# Patient Record
Sex: FEMALE | Race: WHITE | Employment: OTHER | URBAN - METROPOLITAN AREA
[De-identification: names, ages, dates, MRNs, and addresses within clinical notes are randomized per-mention and may not be internally consistent; named-entity substitution may affect disease eponyms.]

---

## 2018-10-22 ENCOUNTER — HOSPITAL ENCOUNTER (OUTPATIENT)
Dept: GENERAL RADIOLOGY | Age: 83
Discharge: HOME | End: 2018-10-22
Payer: MEDICARE

## 2018-10-22 ENCOUNTER — HOSPITAL ENCOUNTER (OUTPATIENT)
Dept: PREADMISSION TESTING | Age: 83
Discharge: HOME | End: 2018-10-22
Payer: MEDICARE

## 2018-10-22 DIAGNOSIS — M17.11 OSTEOARTHRITIS OF RIGHT KNEE: ICD-10-CM

## 2018-10-22 LAB
ALBUMIN SERPL-MCNC: 3.3 G/DL (ref 3.4–5)
ALBUMIN/GLOB SERPL: 1 {RATIO} (ref 0.8–1.7)
ALP SERPL-CCNC: 61 U/L (ref 45–117)
ALT SERPL-CCNC: 22 U/L (ref 13–56)
ANION GAP SERPL CALC-SCNC: 7 MMOL/L (ref 3–18)
APPEARANCE UR: ABNORMAL
APTT PPP: 24.9 SEC (ref 23–36.4)
AST SERPL-CCNC: 21 U/L (ref 15–37)
BACTERIA SPEC CULT: NORMAL
BACTERIA URNS QL MICRO: ABNORMAL /HPF
BASOPHILS # BLD: 0 K/UL (ref 0–0.1)
BASOPHILS NFR BLD: 1 % (ref 0–2)
BILIRUB SERPL-MCNC: 0.3 MG/DL (ref 0.2–1)
BILIRUB UR QL: NEGATIVE
BUN SERPL-MCNC: 18 MG/DL (ref 7–18)
BUN/CREAT SERPL: 25 (ref 12–20)
CALCIUM SERPL-MCNC: 8.3 MG/DL (ref 8.5–10.1)
CHLORIDE SERPL-SCNC: 104 MMOL/L (ref 100–108)
CO2 SERPL-SCNC: 29 MMOL/L (ref 21–32)
COLOR UR: YELLOW
CREAT SERPL-MCNC: 0.71 MG/DL (ref 0.6–1.3)
DIFFERENTIAL METHOD BLD: ABNORMAL
EOSINOPHIL # BLD: 0.1 K/UL (ref 0–0.4)
EOSINOPHIL NFR BLD: 2 % (ref 0–5)
EPITH CASTS URNS QL MICRO: ABNORMAL /LPF (ref 0–5)
ERYTHROCYTE [DISTWIDTH] IN BLOOD BY AUTOMATED COUNT: 25.2 % (ref 11.6–14.5)
GLOBULIN SER CALC-MCNC: 3.2 G/DL (ref 2–4)
GLUCOSE SERPL-MCNC: 99 MG/DL (ref 74–99)
GLUCOSE UR STRIP.AUTO-MCNC: NEGATIVE MG/DL
HCT VFR BLD AUTO: 35.7 % (ref 35–45)
HGB BLD-MCNC: 11.1 G/DL (ref 12–16)
HGB UR QL STRIP: ABNORMAL
INR PPP: 0.9 (ref 0.8–1.2)
KETONES UR QL STRIP.AUTO: ABNORMAL MG/DL
LEUKOCYTE ESTERASE UR QL STRIP.AUTO: ABNORMAL
LYMPHOCYTES # BLD: 1.1 K/UL (ref 0.9–3.6)
LYMPHOCYTES NFR BLD: 17 % (ref 21–52)
MCH RBC QN AUTO: 26.2 PG (ref 24–34)
MCHC RBC AUTO-ENTMCNC: 31.1 G/DL (ref 31–37)
MCV RBC AUTO: 84.4 FL (ref 74–97)
MONOCYTES # BLD: 0.7 K/UL (ref 0.05–1.2)
MONOCYTES NFR BLD: 12 % (ref 3–10)
NEUTS SEG # BLD: 4.1 K/UL (ref 1.8–8)
NEUTS SEG NFR BLD: 68 % (ref 40–73)
NITRITE UR QL STRIP.AUTO: NEGATIVE
PH UR STRIP: 5 [PH] (ref 5–8)
PLATELET # BLD AUTO: 251 K/UL (ref 135–420)
PMV BLD AUTO: 10.4 FL (ref 9.2–11.8)
POTASSIUM SERPL-SCNC: 3.9 MMOL/L (ref 3.5–5.5)
PROT SERPL-MCNC: 6.5 G/DL (ref 6.4–8.2)
PROT UR STRIP-MCNC: 30 MG/DL
PROTHROMBIN TIME: 12.1 SEC (ref 11.5–15.2)
RBC # BLD AUTO: 4.23 M/UL (ref 4.2–5.3)
RBC #/AREA URNS HPF: ABNORMAL /HPF (ref 0–5)
SERVICE CMNT-IMP: NORMAL
SODIUM SERPL-SCNC: 140 MMOL/L (ref 136–145)
SP GR UR REFRACTOMETRY: >1.03 (ref 1–1.03)
UROBILINOGEN UR QL STRIP.AUTO: 1 EU/DL (ref 0.2–1)
WBC # BLD AUTO: 6.1 K/UL (ref 4.6–13.2)
WBC URNS QL MICRO: ABNORMAL /HPF (ref 0–5)

## 2018-10-22 PROCEDURE — 85025 COMPLETE CBC W/AUTO DIFF WBC: CPT | Performed by: ORTHOPAEDIC SURGERY

## 2018-10-22 PROCEDURE — 80053 COMPREHEN METABOLIC PANEL: CPT | Performed by: ORTHOPAEDIC SURGERY

## 2018-10-22 PROCEDURE — 85730 THROMBOPLASTIN TIME PARTIAL: CPT | Performed by: ORTHOPAEDIC SURGERY

## 2018-10-22 PROCEDURE — 81001 URINALYSIS AUTO W/SCOPE: CPT | Performed by: ORTHOPAEDIC SURGERY

## 2018-10-22 PROCEDURE — 93005 ELECTROCARDIOGRAM TRACING: CPT

## 2018-10-22 PROCEDURE — 71045 X-RAY EXAM CHEST 1 VIEW: CPT

## 2018-10-22 PROCEDURE — 85610 PROTHROMBIN TIME: CPT | Performed by: ORTHOPAEDIC SURGERY

## 2018-10-22 PROCEDURE — 87641 MR-STAPH DNA AMP PROBE: CPT | Performed by: ORTHOPAEDIC SURGERY

## 2018-10-22 PROCEDURE — 36415 COLL VENOUS BLD VENIPUNCTURE: CPT | Performed by: ORTHOPAEDIC SURGERY

## 2018-10-25 ENCOUNTER — HOSPITAL ENCOUNTER (OUTPATIENT)
Dept: PREADMISSION TESTING | Age: 83
Discharge: HOME | End: 2018-10-25
Payer: MEDICARE

## 2018-10-25 LAB
ABO + RH BLD: NORMAL
ATRIAL RATE: 73 BPM
BLOOD GROUP ANTIBODIES SERPL: NORMAL
CALCULATED P AXIS, ECG09: 59 DEGREES
CALCULATED R AXIS, ECG10: -72 DEGREES
CALCULATED T AXIS, ECG11: 46 DEGREES
DIAGNOSIS, 93000: NORMAL
P-R INTERVAL, ECG05: 174 MS
Q-T INTERVAL, ECG07: 372 MS
QRS DURATION, ECG06: 80 MS
QTC CALCULATION (BEZET), ECG08: 409 MS
SPECIMEN EXP DATE BLD: NORMAL
VENTRICULAR RATE, ECG03: 73 BPM

## 2018-10-25 PROCEDURE — 86900 BLOOD TYPING SEROLOGIC ABO: CPT | Performed by: ORTHOPAEDIC SURGERY

## 2018-10-25 PROCEDURE — 87086 URINE CULTURE/COLONY COUNT: CPT | Performed by: ORTHOPAEDIC SURGERY

## 2018-10-27 LAB
BACTERIA SPEC CULT: NORMAL
SERVICE CMNT-IMP: NORMAL

## 2018-11-06 RX ORDER — SODIUM CHLORIDE 0.9 % (FLUSH) 0.9 %
5-10 SYRINGE (ML) INJECTION EVERY 8 HOURS
Status: CANCELLED | OUTPATIENT
Start: 2018-11-07

## 2018-11-06 RX ORDER — SODIUM CHLORIDE 0.9 % (FLUSH) 0.9 %
5-10 SYRINGE (ML) INJECTION AS NEEDED
Status: CANCELLED | OUTPATIENT
Start: 2018-11-06

## 2018-11-07 ENCOUNTER — APPOINTMENT (OUTPATIENT)
Dept: GENERAL RADIOLOGY | Age: 83
DRG: 470 | End: 2018-11-07
Attending: PHYSICIAN ASSISTANT
Payer: MEDICARE

## 2018-11-07 ENCOUNTER — ANESTHESIA (OUTPATIENT)
Dept: SURGERY | Age: 83
DRG: 470 | End: 2018-11-07
Payer: MEDICARE

## 2018-11-07 ENCOUNTER — HOSPITAL ENCOUNTER (INPATIENT)
Age: 83
LOS: 3 days | Discharge: NURSING, REHAB OR LONG-TERM CARE | DRG: 470 | End: 2018-11-10
Attending: ORTHOPAEDIC SURGERY | Admitting: ORTHOPAEDIC SURGERY
Payer: MEDICARE

## 2018-11-07 ENCOUNTER — ANESTHESIA EVENT (OUTPATIENT)
Dept: SURGERY | Age: 83
DRG: 470 | End: 2018-11-07
Payer: MEDICARE

## 2018-11-07 DIAGNOSIS — Z96.651 TOTAL KNEE REPLACEMENT STATUS, RIGHT: Primary | ICD-10-CM

## 2018-11-07 PROCEDURE — 74011250636 HC RX REV CODE- 250/636: Performed by: PHYSICIAN ASSISTANT

## 2018-11-07 PROCEDURE — C1776 JOINT DEVICE (IMPLANTABLE): HCPCS | Performed by: ORTHOPAEDIC SURGERY

## 2018-11-07 PROCEDURE — 77030033067 HC SUT PDO STRATFX SPIR J&J -B: Performed by: ORTHOPAEDIC SURGERY

## 2018-11-07 PROCEDURE — 76210000006 HC OR PH I REC 0.5 TO 1 HR: Performed by: ORTHOPAEDIC SURGERY

## 2018-11-07 PROCEDURE — 74011000250 HC RX REV CODE- 250

## 2018-11-07 PROCEDURE — 77030032490 HC SLV COMPR SCD KNE COVD -B: Performed by: ORTHOPAEDIC SURGERY

## 2018-11-07 PROCEDURE — 74011250636 HC RX REV CODE- 250/636

## 2018-11-07 PROCEDURE — 65270000029 HC RM PRIVATE

## 2018-11-07 PROCEDURE — C1713 ANCHOR/SCREW BN/BN,TIS/BN: HCPCS | Performed by: ORTHOPAEDIC SURGERY

## 2018-11-07 PROCEDURE — 74011000250 HC RX REV CODE- 250: Performed by: ORTHOPAEDIC SURGERY

## 2018-11-07 PROCEDURE — C9290 INJ, BUPIVACAINE LIPOSOME: HCPCS | Performed by: ORTHOPAEDIC SURGERY

## 2018-11-07 PROCEDURE — 76010000131 HC OR TIME 2 TO 2.5 HR: Performed by: ORTHOPAEDIC SURGERY

## 2018-11-07 PROCEDURE — 73560 X-RAY EXAM OF KNEE 1 OR 2: CPT

## 2018-11-07 PROCEDURE — 74011250636 HC RX REV CODE- 250/636: Performed by: SPECIALIST

## 2018-11-07 PROCEDURE — 74011250636 HC RX REV CODE- 250/636: Performed by: ORTHOPAEDIC SURGERY

## 2018-11-07 PROCEDURE — 77030018842 HC SOL IRR SOD CL 9% BAXT -A: Performed by: ORTHOPAEDIC SURGERY

## 2018-11-07 PROCEDURE — 3E0T3BZ INTRODUCTION OF ANESTHETIC AGENT INTO PERIPHERAL NERVES AND PLEXI, PERCUTANEOUS APPROACH: ICD-10-PCS | Performed by: SPECIALIST

## 2018-11-07 PROCEDURE — 77030002933 HC SUT MCRYL J&J -A: Performed by: ORTHOPAEDIC SURGERY

## 2018-11-07 PROCEDURE — 77030011256 HC DRSG MEPILEX <16IN NO BORD MOLN -A: Performed by: ORTHOPAEDIC SURGERY

## 2018-11-07 PROCEDURE — 74011000258 HC RX REV CODE- 258: Performed by: ORTHOPAEDIC SURGERY

## 2018-11-07 PROCEDURE — 74011250637 HC RX REV CODE- 250/637: Performed by: PHYSICIAN ASSISTANT

## 2018-11-07 PROCEDURE — 76060000035 HC ANESTHESIA 2 TO 2.5 HR: Performed by: ORTHOPAEDIC SURGERY

## 2018-11-07 PROCEDURE — 77030031139 HC SUT VCRL2 J&J -A: Performed by: ORTHOPAEDIC SURGERY

## 2018-11-07 PROCEDURE — 77030020782 HC GWN BAIR PAWS FLX 3M -B: Performed by: ORTHOPAEDIC SURGERY

## 2018-11-07 PROCEDURE — 64447 NJX AA&/STRD FEMORAL NRV IMG: CPT | Performed by: ORTHOPAEDIC SURGERY

## 2018-11-07 PROCEDURE — 77030011628: Performed by: ORTHOPAEDIC SURGERY

## 2018-11-07 PROCEDURE — 0SRC0J9 REPLACEMENT OF RIGHT KNEE JOINT WITH SYNTHETIC SUBSTITUTE, CEMENTED, OPEN APPROACH: ICD-10-PCS | Performed by: ORTHOPAEDIC SURGERY

## 2018-11-07 PROCEDURE — 77030006804 HC BLD SAW RECIP CNMD -B: Performed by: ORTHOPAEDIC SURGERY

## 2018-11-07 PROCEDURE — 77030013708 HC HNDPC SUC IRR PULS STRY –B: Performed by: ORTHOPAEDIC SURGERY

## 2018-11-07 PROCEDURE — 77030038020 HC MANFLD NEPTUNE STRY -B: Performed by: ORTHOPAEDIC SURGERY

## 2018-11-07 PROCEDURE — 77030027138 HC INCENT SPIROMETER -A: Performed by: ORTHOPAEDIC SURGERY

## 2018-11-07 PROCEDURE — 77030014144 HC TY SPN ANES BBMI -B: Performed by: NURSE ANESTHETIST, CERTIFIED REGISTERED

## 2018-11-07 PROCEDURE — 74011000258 HC RX REV CODE- 258

## 2018-11-07 PROCEDURE — 77030013481 HC CUF TRNQT ZIMM -A: Performed by: ORTHOPAEDIC SURGERY

## 2018-11-07 PROCEDURE — 77030037875 HC DRSG MEPILEX <16IN BORD MOLN -A: Performed by: ORTHOPAEDIC SURGERY

## 2018-11-07 PROCEDURE — 77030038010: Performed by: ORTHOPAEDIC SURGERY

## 2018-11-07 PROCEDURE — 76942 ECHO GUIDE FOR BIOPSY: CPT | Performed by: ORTHOPAEDIC SURGERY

## 2018-11-07 DEVICE — CEMENT BNE 40GM FULL DOSE PMMA W/O ANTIBIO HI VISC N RADPQ: Type: IMPLANTABLE DEVICE | Site: KNEE | Status: FUNCTIONAL

## 2018-11-07 DEVICE — STEM FEM L50MM DIA14MM KNEE CEM REV ATTUNE: Type: IMPLANTABLE DEVICE | Site: KNEE | Status: FUNCTIONAL

## 2018-11-07 DEVICE — CEMENT BNE 40GM FULL DOSE PMMA W/ GENT HI VISC RADPQ LNG: Type: IMPLANTABLE DEVICE | Site: KNEE | Status: FUNCTIONAL

## 2018-11-07 DEVICE — COMPONENT PAT DIA32MM KNEE POLY CEM MEDIALIZED ANAT ATTUNE: Type: IMPLANTABLE DEVICE | Site: KNEE | Status: FUNCTIONAL

## 2018-11-07 RX ORDER — FENTANYL CITRATE 50 UG/ML
INJECTION, SOLUTION INTRAMUSCULAR; INTRAVENOUS AS NEEDED
Status: DISCONTINUED | OUTPATIENT
Start: 2018-11-07 | End: 2018-11-07 | Stop reason: HOSPADM

## 2018-11-07 RX ORDER — MAGNESIUM SULFATE 100 %
4 CRYSTALS MISCELLANEOUS AS NEEDED
Status: DISCONTINUED | OUTPATIENT
Start: 2018-11-07 | End: 2018-11-07 | Stop reason: HOSPADM

## 2018-11-07 RX ORDER — GUAIFENESIN 100 MG/5ML
81 LIQUID (ML) ORAL 2 TIMES DAILY
Status: DISCONTINUED | OUTPATIENT
Start: 2018-11-07 | End: 2018-11-10 | Stop reason: HOSPADM

## 2018-11-07 RX ORDER — KETOROLAC TROMETHAMINE 30 MG/ML
15 INJECTION, SOLUTION INTRAMUSCULAR; INTRAVENOUS EVERY 6 HOURS
Status: COMPLETED | OUTPATIENT
Start: 2018-11-07 | End: 2018-11-08

## 2018-11-07 RX ORDER — OMEPRAZOLE 20 MG/1
40 CAPSULE, DELAYED RELEASE ORAL DAILY
Status: DISCONTINUED | OUTPATIENT
Start: 2018-11-08 | End: 2018-11-10 | Stop reason: HOSPADM

## 2018-11-07 RX ORDER — MIDAZOLAM HYDROCHLORIDE 1 MG/ML
INJECTION, SOLUTION INTRAMUSCULAR; INTRAVENOUS AS NEEDED
Status: DISCONTINUED | OUTPATIENT
Start: 2018-11-07 | End: 2018-11-07 | Stop reason: HOSPADM

## 2018-11-07 RX ORDER — CEFAZOLIN SODIUM/WATER 2 G/20 ML
2 SYRINGE (ML) INTRAVENOUS EVERY 8 HOURS
Status: COMPLETED | OUTPATIENT
Start: 2018-11-07 | End: 2018-11-08

## 2018-11-07 RX ORDER — NIFEDIPINE 30 MG/1
30 TABLET, EXTENDED RELEASE ORAL DAILY
Status: DISCONTINUED | OUTPATIENT
Start: 2018-11-08 | End: 2018-11-10 | Stop reason: HOSPADM

## 2018-11-07 RX ORDER — DOCUSATE SODIUM 100 MG/1
100 CAPSULE, LIQUID FILLED ORAL 3 TIMES DAILY
Status: DISCONTINUED | OUTPATIENT
Start: 2018-11-07 | End: 2018-11-10 | Stop reason: HOSPADM

## 2018-11-07 RX ORDER — FLUTICASONE FUROATE AND VILANTEROL 100; 25 UG/1; UG/1
1 POWDER RESPIRATORY (INHALATION) DAILY
Status: DISCONTINUED | OUTPATIENT
Start: 2018-11-08 | End: 2018-11-10 | Stop reason: HOSPADM

## 2018-11-07 RX ORDER — DIPHENHYDRAMINE HYDROCHLORIDE 50 MG/ML
12.5 INJECTION, SOLUTION INTRAMUSCULAR; INTRAVENOUS
Status: DISCONTINUED | OUTPATIENT
Start: 2018-11-07 | End: 2018-11-10 | Stop reason: HOSPADM

## 2018-11-07 RX ORDER — CEFAZOLIN SODIUM/WATER 2 G/20 ML
2 SYRINGE (ML) INTRAVENOUS ONCE
Status: DISCONTINUED | OUTPATIENT
Start: 2018-11-07 | End: 2018-11-07 | Stop reason: CLARIF

## 2018-11-07 RX ORDER — FENTANYL CITRATE 50 UG/ML
25 INJECTION, SOLUTION INTRAMUSCULAR; INTRAVENOUS AS NEEDED
Status: DISCONTINUED | OUTPATIENT
Start: 2018-11-07 | End: 2018-11-07 | Stop reason: HOSPADM

## 2018-11-07 RX ORDER — NALOXONE HYDROCHLORIDE 0.4 MG/ML
0.4 INJECTION, SOLUTION INTRAMUSCULAR; INTRAVENOUS; SUBCUTANEOUS AS NEEDED
Status: DISCONTINUED | OUTPATIENT
Start: 2018-11-07 | End: 2018-11-10 | Stop reason: HOSPADM

## 2018-11-07 RX ORDER — DEXTROSE 50 % IN WATER (D50W) INTRAVENOUS SYRINGE
25-50 AS NEEDED
Status: DISCONTINUED | OUTPATIENT
Start: 2018-11-07 | End: 2018-11-07 | Stop reason: HOSPADM

## 2018-11-07 RX ORDER — SODIUM CHLORIDE 0.9 % (FLUSH) 0.9 %
5-10 SYRINGE (ML) INJECTION EVERY 8 HOURS
Status: DISCONTINUED | OUTPATIENT
Start: 2018-11-07 | End: 2018-11-10 | Stop reason: HOSPADM

## 2018-11-07 RX ORDER — ONDANSETRON 2 MG/ML
4 INJECTION INTRAMUSCULAR; INTRAVENOUS ONCE
Status: DISCONTINUED | OUTPATIENT
Start: 2018-11-07 | End: 2018-11-07 | Stop reason: HOSPADM

## 2018-11-07 RX ORDER — ONDANSETRON 2 MG/ML
INJECTION INTRAMUSCULAR; INTRAVENOUS AS NEEDED
Status: DISCONTINUED | OUTPATIENT
Start: 2018-11-07 | End: 2018-11-07 | Stop reason: HOSPADM

## 2018-11-07 RX ORDER — SODIUM CHLORIDE 9 MG/ML
125 INJECTION, SOLUTION INTRAVENOUS CONTINUOUS
Status: DISCONTINUED | OUTPATIENT
Start: 2018-11-07 | End: 2018-11-07 | Stop reason: HOSPADM

## 2018-11-07 RX ORDER — ACETAMINOPHEN 325 MG/1
975 TABLET ORAL EVERY 8 HOURS
Status: DISCONTINUED | OUTPATIENT
Start: 2018-11-07 | End: 2018-11-08

## 2018-11-07 RX ORDER — CLINDAMYCIN PHOSPHATE 900 MG/50ML
900 INJECTION, SOLUTION INTRAVENOUS ONCE
Status: COMPLETED | OUTPATIENT
Start: 2018-11-07 | End: 2018-11-07

## 2018-11-07 RX ORDER — DIPHENHYDRAMINE HCL 25 MG
25 CAPSULE ORAL
Status: DISCONTINUED | OUTPATIENT
Start: 2018-11-07 | End: 2018-11-10 | Stop reason: HOSPADM

## 2018-11-07 RX ORDER — LORAZEPAM 0.5 MG/1
0.5 TABLET ORAL
Status: DISCONTINUED | OUTPATIENT
Start: 2018-11-07 | End: 2018-11-10 | Stop reason: HOSPADM

## 2018-11-07 RX ORDER — SODIUM CHLORIDE, SODIUM LACTATE, POTASSIUM CHLORIDE, CALCIUM CHLORIDE 600; 310; 30; 20 MG/100ML; MG/100ML; MG/100ML; MG/100ML
125 INJECTION, SOLUTION INTRAVENOUS CONTINUOUS
Status: DISCONTINUED | OUTPATIENT
Start: 2018-11-07 | End: 2018-11-07

## 2018-11-07 RX ORDER — SODIUM CHLORIDE 0.9 % (FLUSH) 0.9 %
5-10 SYRINGE (ML) INJECTION AS NEEDED
Status: DISCONTINUED | OUTPATIENT
Start: 2018-11-07 | End: 2018-11-10 | Stop reason: HOSPADM

## 2018-11-07 RX ORDER — ONDANSETRON 2 MG/ML
4 INJECTION INTRAMUSCULAR; INTRAVENOUS
Status: DISCONTINUED | OUTPATIENT
Start: 2018-11-07 | End: 2018-11-10 | Stop reason: HOSPADM

## 2018-11-07 RX ORDER — LANOLIN ALCOHOL/MO/W.PET/CERES
325 CREAM (GRAM) TOPICAL
Status: DISCONTINUED | OUTPATIENT
Start: 2018-11-08 | End: 2018-11-10 | Stop reason: HOSPADM

## 2018-11-07 RX ORDER — FENTANYL CITRATE 50 UG/ML
50 INJECTION, SOLUTION INTRAMUSCULAR; INTRAVENOUS
Status: DISCONTINUED | OUTPATIENT
Start: 2018-11-07 | End: 2018-11-07 | Stop reason: HOSPADM

## 2018-11-07 RX ORDER — NALOXONE HYDROCHLORIDE 0.4 MG/ML
0.1 INJECTION, SOLUTION INTRAMUSCULAR; INTRAVENOUS; SUBCUTANEOUS AS NEEDED
Status: DISCONTINUED | OUTPATIENT
Start: 2018-11-07 | End: 2018-11-07 | Stop reason: HOSPADM

## 2018-11-07 RX ORDER — OXYCODONE HYDROCHLORIDE 5 MG/1
5 TABLET ORAL
Status: DISCONTINUED | OUTPATIENT
Start: 2018-11-07 | End: 2018-11-08

## 2018-11-07 RX ORDER — ESCITALOPRAM OXALATE 10 MG/1
10 TABLET ORAL
Status: DISCONTINUED | OUTPATIENT
Start: 2018-11-07 | End: 2018-11-10 | Stop reason: HOSPADM

## 2018-11-07 RX ORDER — LEFLUNOMIDE 10 MG/1
10 TABLET ORAL DAILY
Status: DISCONTINUED | OUTPATIENT
Start: 2018-11-08 | End: 2018-11-10 | Stop reason: HOSPADM

## 2018-11-07 RX ORDER — HYDROMORPHONE HYDROCHLORIDE 2 MG/ML
0.5 INJECTION, SOLUTION INTRAMUSCULAR; INTRAVENOUS; SUBCUTANEOUS
Status: DISCONTINUED | OUTPATIENT
Start: 2018-11-07 | End: 2018-11-10 | Stop reason: HOSPADM

## 2018-11-07 RX ORDER — OXYCODONE HYDROCHLORIDE 5 MG/1
10 TABLET ORAL
Status: DISCONTINUED | OUTPATIENT
Start: 2018-11-07 | End: 2018-11-08

## 2018-11-07 RX ORDER — SODIUM CHLORIDE 0.9 % (FLUSH) 0.9 %
5-10 SYRINGE (ML) INJECTION AS NEEDED
Status: DISCONTINUED | OUTPATIENT
Start: 2018-11-07 | End: 2018-11-07 | Stop reason: HOSPADM

## 2018-11-07 RX ORDER — ROPIVACAINE HYDROCHLORIDE 5 MG/ML
INJECTION, SOLUTION EPIDURAL; INFILTRATION; PERINEURAL
Status: COMPLETED | OUTPATIENT
Start: 2018-11-07 | End: 2018-11-07

## 2018-11-07 RX ORDER — SODIUM CHLORIDE, SODIUM LACTATE, POTASSIUM CHLORIDE, CALCIUM CHLORIDE 600; 310; 30; 20 MG/100ML; MG/100ML; MG/100ML; MG/100ML
100 INJECTION, SOLUTION INTRAVENOUS CONTINUOUS
Status: DISPENSED | OUTPATIENT
Start: 2018-11-07 | End: 2018-11-08

## 2018-11-07 RX ORDER — PROPOFOL 10 MG/ML
INJECTION, EMULSION INTRAVENOUS
Status: DISCONTINUED | OUTPATIENT
Start: 2018-11-07 | End: 2018-11-07 | Stop reason: HOSPADM

## 2018-11-07 RX ADMIN — ACETAMINOPHEN 975 MG: 325 TABLET ORAL at 20:50

## 2018-11-07 RX ADMIN — MIDAZOLAM HYDROCHLORIDE 1 MG: 1 INJECTION, SOLUTION INTRAMUSCULAR; INTRAVENOUS at 15:49

## 2018-11-07 RX ADMIN — FENTANYL CITRATE 25 MCG: 50 INJECTION, SOLUTION INTRAMUSCULAR; INTRAVENOUS at 15:59

## 2018-11-07 RX ADMIN — MIDAZOLAM HYDROCHLORIDE 1 MG: 1 INJECTION, SOLUTION INTRAMUSCULAR; INTRAVENOUS at 15:45

## 2018-11-07 RX ADMIN — ONDANSETRON 4 MG: 2 INJECTION INTRAMUSCULAR; INTRAVENOUS at 15:47

## 2018-11-07 RX ADMIN — Medication 10 ML: at 22:00

## 2018-11-07 RX ADMIN — SODIUM CHLORIDE, SODIUM LACTATE, POTASSIUM CHLORIDE, AND CALCIUM CHLORIDE 125 ML/HR: 600; 310; 30; 20 INJECTION, SOLUTION INTRAVENOUS at 11:53

## 2018-11-07 RX ADMIN — FENTANYL CITRATE 25 MCG: 50 INJECTION, SOLUTION INTRAMUSCULAR; INTRAVENOUS at 16:14

## 2018-11-07 RX ADMIN — DOCUSATE SODIUM 100 MG: 100 CAPSULE, LIQUID FILLED ORAL at 22:05

## 2018-11-07 RX ADMIN — FENTANYL CITRATE 25 MCG: 50 INJECTION, SOLUTION INTRAMUSCULAR; INTRAVENOUS at 15:56

## 2018-11-07 RX ADMIN — ESCITALOPRAM OXALATE 10 MG: 10 TABLET ORAL at 22:05

## 2018-11-07 RX ADMIN — Medication 81 MG: at 22:05

## 2018-11-07 RX ADMIN — MIDAZOLAM HYDROCHLORIDE 2 MG: 1 INJECTION, SOLUTION INTRAMUSCULAR; INTRAVENOUS at 13:38

## 2018-11-07 RX ADMIN — MIDAZOLAM HYDROCHLORIDE 2 MG: 1 INJECTION, SOLUTION INTRAMUSCULAR; INTRAVENOUS at 14:46

## 2018-11-07 RX ADMIN — HYDROMORPHONE HYDROCHLORIDE 0.5 MG: 2 INJECTION INTRAMUSCULAR; INTRAVENOUS; SUBCUTANEOUS at 23:10

## 2018-11-07 RX ADMIN — FENTANYL CITRATE 25 MCG: 50 INJECTION, SOLUTION INTRAMUSCULAR; INTRAVENOUS at 18:19

## 2018-11-07 RX ADMIN — TRANEXAMIC ACID 1 G: 100 INJECTION, SOLUTION INTRAVENOUS at 16:19

## 2018-11-07 RX ADMIN — SODIUM CHLORIDE, SODIUM LACTATE, POTASSIUM CHLORIDE, AND CALCIUM CHLORIDE: 600; 310; 30; 20 INJECTION, SOLUTION INTRAVENOUS at 15:17

## 2018-11-07 RX ADMIN — CLINDAMYCIN PHOSPHATE 900 MG: 900 INJECTION, SOLUTION INTRAVENOUS at 14:54

## 2018-11-07 RX ADMIN — FENTANYL CITRATE 25 MCG: 50 INJECTION, SOLUTION INTRAMUSCULAR; INTRAVENOUS at 16:11

## 2018-11-07 RX ADMIN — TRANEXAMIC ACID 1 G: 100 INJECTION, SOLUTION INTRAVENOUS at 15:00

## 2018-11-07 RX ADMIN — Medication 2 G: at 20:50

## 2018-11-07 RX ADMIN — ROPIVACAINE HYDROCHLORIDE 15 ML: 5 INJECTION, SOLUTION EPIDURAL; INFILTRATION; PERINEURAL at 13:40

## 2018-11-07 RX ADMIN — PROPOFOL 50 MCG/KG/MIN: 10 INJECTION, EMULSION INTRAVENOUS at 15:03

## 2018-11-07 RX ADMIN — SODIUM CHLORIDE, SODIUM LACTATE, POTASSIUM CHLORIDE, AND CALCIUM CHLORIDE 100 ML/HR: 600; 310; 30; 20 INJECTION, SOLUTION INTRAVENOUS at 22:07

## 2018-11-07 RX ADMIN — KETOROLAC TROMETHAMINE 15 MG: 30 INJECTION, SOLUTION INTRAMUSCULAR at 20:50

## 2018-11-07 NOTE — PROGRESS NOTES
PT orders received and chart reviewed. Arrived at pt room 208 however pt is not on floor yet. Will return later as pt arrives and schedule allows.

## 2018-11-07 NOTE — PERIOP NOTES
Pt. Used restroom in pre-op area with assistance. Patient placed on Damien Paws for a minimum of 30 min in  Preop.

## 2018-11-07 NOTE — ANESTHESIA PROCEDURE NOTES
Peripheral Block    Start time: 11/7/2018 1:38 PM  End time: 11/7/2018 1:41 PM  Performed by: Anabelle Galindo MD  Authorized by: Anabelle Galindo MD       Pre-procedure: Indications: at surgeon's request, post-op pain management and procedure for pain    Preanesthetic Checklist: patient identified, risks and benefits discussed, site marked, timeout performed, anesthesia consent given and patient being monitored    Timeout Time: 13:38          Block Type:   Block Type:   Adductor canal  Laterality:  Right  Monitoring:  Standard ASA monitoring, responsive to questions, continuous pulse ox, oxygen, frequent vital sign checks and heart rate  Injection Technique:  Single shot  Procedures: ultrasound guided    Patient Position: supine  Prep: chlorhexidine    Location:  Mid thigh  Needle Type:  Stimuplex  Needle Gauge:  21 G  Needle Localization:  Anatomical landmarks    Assessment:  Number of attempts:  1  Injection Assessment:  Incremental injection every 5 mL, no paresthesia, ultrasound image on chart, local visualized surrounding nerve on ultrasound, negative aspiration for blood, no intravascular symptoms and low pressure verified by pressure monitor  Patient tolerance:  Patient tolerated the procedure well with no immediate complications

## 2018-11-07 NOTE — ANESTHESIA POSTPROCEDURE EVALUATION
Post-Anesthesia Evaluation and Assessment    Cardiovascular Function/Vital Signs  Visit Vitals  /85   Pulse 78   Temp 36.8 °C (98.2 °F)   Resp 24   Ht 4' 11\" (1.499 m)   Wt 54.5 kg (120 lb 2 oz)   SpO2 100%   BMI 24.26 kg/m²       Patient is status post Procedure(s):  RIGHT  KNEE COMPLEX PRIMARY TOTAL KNEE ARTHROPLASTY AND ALL INDICATED PROCEDURES. Nausea/Vomiting: Controlled. Postoperative hydration reviewed and adequate. Pain:  Pain Scale 1: Numeric (0 - 10) (11/07/18 1720)  Pain Intensity 1: 0 (11/07/18 1720)   Managed. Neurological Status:   Neuro (WDL): Exceptions to WDL (11/07/18 1720)   At baseline. Mental Status and Level of Consciousness: Arousable. Pulmonary Status:   O2 Device: Nasal cannula (11/07/18 1720)   Adequate oxygenation and airway patent. Complications related to anesthesia: None    Post-anesthesia assessment completed. No concerns. Patient has met all discharge requirements. Signed By: Carlitos Winchester CRNA    November 7, 2018             Procedure(s):  RIGHT  KNEE COMPLEX PRIMARY TOTAL KNEE ARTHROPLASTY AND ALL INDICATED PROCEDURES. Anesthesia Post Evaluation        Comments: Post-Anesthesia Evaluation and Assessment    Cardiovascular Function/Vital Signs  /85   Pulse 78   Temp 36.8 °C (98.2 °F)   Resp 24   Ht 4' 11\" (1.499 m)   Wt 54.5 kg (120 lb 2 oz)   SpO2 100%   BMI 24.26 kg/m²     Patient is status post Procedure(s):  RIGHT  KNEE COMPLEX PRIMARY TOTAL KNEE ARTHROPLASTY AND ALL INDICATED PROCEDURES. Nausea/Vomiting: Controlled. Postoperative hydration reviewed and adequate. Pain:  Pain Scale 1: Numeric (0 - 10) (11/07/18 1720)  Pain Intensity 1: 0 (11/07/18 1720)   Managed. Neurological Status:   Neuro (WDL): Exceptions to WDL (11/07/18 1720)   At baseline. Mental Status and Level of Consciousness: Arousable.     Pulmonary Status:   O2 Device: Nasal cannula (11/07/18 1720)   Adequate oxygenation and airway patent. Complications related to anesthesia: None    Post-anesthesia assessment completed. No concerns. Patient has met all discharge requirements.     Signed By: Irineo Mena MD    November 7, 2018                     Visit Vitals  /85   Pulse 78   Temp 36.8 °C (98.2 °F)   Resp 24   Ht 4' 11\" (1.499 m)   Wt 54.5 kg (120 lb 2 oz)   SpO2 100%   BMI 24.26 kg/m²

## 2018-11-07 NOTE — ROUTINE PROCESS
TRANSFER - IN REPORT:    Verbal report received from Kj Hogan RN on Aiyana Piña  being received from PACU for routine post - op. Report consisted of patients Situation, Background, Assessment and   Recommendations(SBAR). Information from the following report(s) SBAR, Kardex, OR Summary, Intake/Output and MAR was reviewed with the receiving nurse. Opportunity for questions and clarification was provided. Assessment to be completed upon patients arrival to unit and care assumed.

## 2018-11-07 NOTE — PERIOP NOTES
TRANSFER - IN REPORT:    Verbal report received from Maggie Slater CRNA and Marcie Smith RN (name) on Elsa Thompson  being received from OR (unit) for routine post - op      Report consisted of patients Situation, Background, Assessment and   Recommendations(SBAR). Information from the following report(s) SBAR, Kardex, OR Summary, Procedure Summary, Intake/Output and MAR was reviewed with the receiving nurse. Opportunity for questions and clarification was provided. Assessment completed upon patients arrival to unit and care assumed.

## 2018-11-07 NOTE — INTERVAL H&P NOTE
H&P Update:  Dandre Dee was seen and examined. History and physical has been reviewed. The patient has been examined.  There have been no significant clinical changes since the completion of the originally dated History and Physical.    Signed By: Elisabeth Fernandez MD     November 7, 2018 12:10 PM

## 2018-11-07 NOTE — OP NOTES
[address] [place], [place] [address], [phone]    COMPLEX PRIMARY TOTAL KNEE ARTHROPLASTY    Patient: Emperatriz Valdivia MRN: [id number]  SSN: [id number]    YOB: 1933  Age: 80 y.o. Sex: female      Date of Surgery: 11/7/2018   Preoperative Diagnosis: RIGHT KNEE OSTEOARTHRITIS   Postoperative Diagnosis: RIGHT KNEE OSTEOARTHRITIS   Location: [facility]  Surgeon: Jennifer Bishop MD  Physician Assistant: JASMINA Ortiz was medically necessary for holding of retractors for exposure and to complete the case  Assistant: Circ-1: Nathalie Latif RN  Circ-Relief: Apryl Dunham RN  Physician Assistant: Xiao Vega PA-C  Scrub Tech-2: Sherry Ashford  Scrub Tech-Relief: Adriana Edwards  Scrub RN-1: Marti Johnston RN  Surg Asst-Relief: Pipe Dan    Anesthesia: SPINAL + Low femoral Nerve Block + local    Procedure: Right Total Knee Arthroplasty (CPT: 60057), modifier 22: 20% increased time and effort due to severe valgus deformity requiring stemmed revision type implants    Findings:  Degenerative joint disease of the right knee with severe valgus deformity    Estimated Blood Loss: 200 mL    Fluids: see anesthesia record    Specimens: None    Cultures: None    Complications: None    Tourniquet Time:   Total Tourniquet Time Documented:  Thigh (Right) - 17 minutes  Total: Thigh (Right) - 17 minutes    Tourniquet Pressure: 250 mm Hg and 300 mm Hg    Tranexamic Acid: 1 gram IV: one dose at begining of case and one at the end    Exparel solution: 20 mL (13 mg/mL) + 40 mL NS    Implants:   Implant Name Type Inv.  Item Serial No.  Lot No. LRB No. Used Action   CEMENT BNE [facility] GHV 40GM -- SMARTSET - [id number]  CEMENT [facility] GHV 40GM -- SMARTSET  [facility] ORTHOPEDICS [id number] Right 1 Implanted   CEMENT BNE SMARTSET HV 40GM -- ORDER IN SETS OF 20 - [id number]  CEMENT BNE SMARTSET HV 40GM -- ORDER IN SETS OF 20  [facility] ORTHOPEDICS [id number] Right 1 Implanted   PAT TORO MEDIAL FAREED 32MM -- ATTUNE - [id number]  PAT TORO MEDIAL FAREED 32MM -- ATTUNE  [facility] [id number] Right 1 Implanted   ATTUNE KNEE SYSTEM REVISION CEMENTED STEM 14MM X 80MM    [facility] [id number] Right 1 Implanted   ATTUNE KNEE SYSTEM REVISION CRS FEMORAL SIZE 5 RIGHT CEMENETED     [facility] ORTHOPEDICS [id number] Right 1 Implanted   STEM TORO [id number] -- ATTUNE - [id number]  STEM TORO [id number] -- ATTUNE  [facility] [id number] Right 1 Implanted   ATTUNE KNEE SYSTEM REVISION TIBIAL BASE ROTATING PLATFORM SIZE 3 CEMENTED     [facility] [id number] Right 1 Implanted   ATTUNE KNEE SYSTEM REVISION CRS ROTATING PLATFORM INSERT SIZE 5 6MM AOX    [facility] [id number] Right 1 Implanted        Patient Condition: Stable.    ---------  INDICATIONS:   This 80y.o.-year-old female has had pain in the right knee for many years and has become functionally disabled with respect to the activities of daily living. The pain is increased with weight-bearing. The pain and dysfunction were not releaved by at least three months of conservative therapy such as NSAIDS (ibuprofen, aleve), analgesics (tylenol), structured flexibility and muscle strengthening physical therapy exercises, activity restrictions, intraarticular cortisone injections, bracing, and use of a cane. The radiographs showed valgus alignment and advanced arthritis with joint space narrowing, subchondral sclerosis, and periarticular osteophytes. A right total knee replacement has been recommended. The risks and the possible complications of this surgery and anesthesia including, but not exclusive to, bleeding, infection, dislocation, fracture, blood clots, nerve and vascular injury, possible need for other procedures, and possibly death have been explained to the patient.   The patient accepts these risks for the benefits of joint replacement over the failure of non-operative care to provide adequate pain relief and improve their functional disability. The patients medical problems have been addressed by their primary care physician and are deemed stable and as well controlled as possible. Inpatient hospital care was medically necessary, reasonable, and appropriate. This is a medically necessary procedure. As such, without use of a surgical assistant to retract soft tissues, protect vital neuro-vascular structures and assist in the technical aspects of the operation, this procedure would not have been possible. Therefore this assistant was medically necessary. This patient has no local or systemic contraindications to total joint arthroplasty. DESCRIPTION OF PROCEDURE:    After the risks and benefits of surgery were discussed, informed consent was obtained.  The patient was identified in the preoperative holding area and the operative extremity was marked.  Preoperative femoral nerve block was performed by anesthesia at the level of the adductor canal. The patient was taken to the operating room and placed in the supine position.  Spinal anesthesia was performed.  IV antibiotics were given.  A Holt catheter was not placed.  After verification of the appropriate operative site from the consent form, with confirmation of surgeon, anesthesia and nursing teams, a tourniquet was placed and the right leg was prepped and draped in sterile fashion using Chloraprep.  A formal timeout was performed.  The tourniquet was inflated and a midline incision was made over the anterior knee medial to the tibial tubercle and the dissection was taken down to Kyung's fascia.  A medial parapatellar arthrotomy was performed, medial release was made and the infrapatellar fat pad was trimmed.  The anterior portions of the medial and lateral menisci were excised.      There was bleeding in spite of the tourniquet at 250 mm Hg, so it was let down.  Esmark exsanguination was performed and it was reinflated to 300 mm Hg, but still there was bleeding. This may be due to her calcified arteries which could cause tourniquet to be ineffective. The case continued without tourniquet after a total tourniquet time of 17 minutes. The patella measured 22 mm.  A freehand patellar cut was made followed by placement of the protective plate.  The knee was flexed and the patella was  allowed to subluxate into the lateral gutter and the knee was flexed.  Inspection of the knee revealed cartilage loss from all three compartments greatest laterally. The femur was drilled and intramedullary cutting jig was placed in 5 degrees of valgus and 9 mm of distal resection.  The distal femoral cut was made.  The tibia was then subluxed anteriorly with a large bent knee retractor.  The PCL was released from the posterior tibia.  The remaining medial and lateral menisci and the periarticular osteophytes were removed.  The lateral genicular artery was cauterized.  An extramedullary tibial guide was used and a tibial cut was made.  The tibia was reamed up to 14 mm. The extension gap was assessed to ensure full extension could be achieved.  Trial size 3 tibial component was placed with 14 mm stem. Gap measuring blocks were then used to examine flexion and extension gaps and confirm femoral component size and rotation.   Positioning pins for the distal femoral cutting block were placed into the femur over the IM bronson.  The size 5 Attune femoral cutting block was placed.  Anterior-posterior position of the cutting guide was checked using a Puga guide.  Femoral cuts were made. Using the Finishing Guide over the IM bronson, the notch bone was removed. Posterior osteophytes were removed.     Femoral trial implant was placed.  The patella preparation was then completed with sizing and positioning of the patellar component and drilling of the patellar lugs.  The trial patellar implant measured 22 mm.  Patellar tracking was midline so no lateral retinacular release was needed.  Ligament balancing was performed as needed with release of MCL done for exposure done earlier in the case.  The PCL was sacrificed and there was significant lateral femoral bone loss so varus valgus constrained stemmed implants were used.  Excellent stability was achieved.   A complete synovectomy was performed.  The trial components were removed.  20 cc of diluted Exparel solution and 10 cc of 0.25% Marcaine were seperately injected into the posterior soft tissues using seperate syringes taking care to aspirate prior to injecting to prevent any intravascular injection.  The cement was prepared. After preparing the bony surfaces with pulsatile lavage saline, the real components were cemented into place.  Excess cement was removed prior to hardening with the cement allowed to set up with axial pressure across the knee in full extension. While the cement was drying, an additional 20 cc of 0.25% Marcaine and 40 cc of diluted Exparel solution were injected into the periarticular soft tissues and periosteum.  After drying of the cement, the knee was checked for any remaining excess cement and irrigated.  A standard layered closure was performed using #1 PDS Stratafix for the fascia, 0 Vicryl for the subcutaneous layer.  3-0 Vicryl was used for the subcuticular layer followed by a running subcuticular skin closure with a #3-0 Monocryl.  PRINEO was applied.  Sterile dressings were placed.  The patient was awakened and there were no complications.  Final sponge and needle counts were correct x2. This case required 20% increased time and effort due to severe valgus deformity requiring stemmed revision type implants.     Sarahy García MD

## 2018-11-07 NOTE — ANESTHESIA PREPROCEDURE EVALUATION
Anesthetic History   No history of anesthetic complications            Review of Systems / Medical History  Patient summary reviewed, nursing notes reviewed and pertinent labs reviewed    Pulmonary            Asthma : well controlled       Neuro/Psych   Within defined limits           Cardiovascular                  Exercise tolerance: >4 METS     GI/Hepatic/Renal  Within defined limits              Endo/Other        Arthritis     Other Findings              Physical Exam    Airway  Mallampati: II  TM Distance: 4 - 6 cm  Neck ROM: normal range of motion   Mouth opening: Normal     Cardiovascular  Regular rate and rhythm,  S1 and S2 normal,  no murmur, click, rub, or gallop             Dental  No notable dental hx       Pulmonary  Breath sounds clear to auscultation               Abdominal  GI exam deferred       Other Findings            Anesthetic Plan    ASA: 2  Anesthesia type: spinal and regional - femoral single shot  Risk and benefits fully explained to the patient including bleeding, headache, nerve damage, infection, nausea, back pain, and hemodynamic changes. Patient understands and agrees to the procedure.     Post-op pain plan if not by surgeon: peripheral nerve block single    Induction: Intravenous  Anesthetic plan and risks discussed with: Patient

## 2018-11-07 NOTE — BRIEF OP NOTE
BRIEF OPERATIVE NOTE    Date of Procedure: 11/7/2018   Preoperative Diagnosis: RIGHT KNEE OSTEOARTHRITIS  Postoperative Diagnosis: RIGHT KNEE OSTEOARTHRITIS    Procedure(s):  RIGHT  KNEE COMPLEX PRIMARY TOTAL KNEE ARTHROPLASTY AND ALL INDICATED PROCEDURES  Surgeon(s) and Role:     * Fransisca Bay MD - Primary         Surgical Assistant: JASMINA Capellan    Surgical Staff:  Circ-1: Lulla Schwab, RN  Circ-Relief: Sandeep Montoya RN  Physician Assistant: JASMINA Curtis-BETSY  Scrub Tech-2: Blue Harris  Scrub Tech-Relief: Mar Glynn  Scrub RN-1: Regis Awan RN  Surg Asst-Relief: Da Liming  Event Time In Time Out   Incision Start 1512    Incision Close       Anesthesia: Regional   Estimated Blood Loss: 200 mL  Specimens: * No specimens in log *   Findings: DJD, severe valgus deformity   Complications: none  Implants:   Implant Name Type Inv.  Item Serial No.  Lot No. LRB No. Used Action   CEMENT BNE [facility] GHV 40GM -- SMARTSET - [id number]  CEMENT BNE [facility] 40GM -- SMARTSET  [facility] [id number] Right 1 Implanted   CEMENT BNE SMARTSET HV 40GM -- ORDER IN SETS OF 20 - [id number]  CEMENT BNE SMARTSET HV 40GM -- ORDER IN SETS OF 20  [facility] [id number] Right 1 Implanted   PAT TORO MEDIAL FAREED 32MM -- ATTUNE - [id number]  PAT TORO MEDIAL FAREED 32MM -- ATTUNE  [facility] [employer] [id number] Right 1 Implanted   ATTUNE KNEE SYSTEM REVISION CEMENTED STEM 14MM X 80MM    [facility] [employer] [id number] Right 1 Implanted   ATTUNE KNEE SYSTEM REVISION CRS FEMORAL SIZE 5 RIGHT CEMENETED     [facility] [employer] [id number] Right 1 Implanted   STEM TORO [id number] -- ATTUNE - [id number]  STEM TORO [id number] -- ATTUNE  [facility] [employer] [id number] Right 1 Implanted   ATTUNE KNEE SYSTEM REVISION TIBIAL BASE ROTATING PLATFORM SIZE 3 CEMENTED     [facility] [employer] [id number] Right 1 Implanted   ATTUNE KNEE SYSTEM REVISION CRS ROTATING PLATFORM INSERT SIZE 5 6MM AOX [facility] ORTHOPEDICS  Right 1 Implanted

## 2018-11-07 NOTE — PERIOP NOTES
Bedside report to receiving nurse Debby GARCIA, current vital signs noted below. Dressing dry and intact. Family in waiting room. No further questions from receiving nurse. Skin assessment completed.     Visit Vitals  /72 (BP 1 Location: Right arm, BP Patient Position: At rest)   Pulse 79   Temp 98.2 °F (36.8 °C)   Resp 18   Ht 4' 11\" (1.499 m)   Wt 54.5 kg (120 lb 2 oz)   SpO2 98%   BMI 24.26 kg/m²     Willy Rosario RN

## 2018-11-08 ENCOUNTER — APPOINTMENT (OUTPATIENT)
Dept: GENERAL RADIOLOGY | Age: 83
DRG: 470 | End: 2018-11-08
Attending: HOSPITALIST
Payer: MEDICARE

## 2018-11-08 PROBLEM — R06.02 SOB (SHORTNESS OF BREATH): Status: ACTIVE | Noted: 2018-11-08

## 2018-11-08 PROBLEM — R00.0 TACHYCARDIA: Status: ACTIVE | Noted: 2018-11-08

## 2018-11-08 LAB
ANION GAP SERPL CALC-SCNC: 9 MMOL/L (ref 3–18)
BASOPHILS # BLD: 0 K/UL (ref 0–0.1)
BASOPHILS NFR BLD: 0 % (ref 0–2)
BUN SERPL-MCNC: 12 MG/DL (ref 7–18)
BUN/CREAT SERPL: 17
CALCIUM SERPL-MCNC: 7.6 MG/DL (ref 8.5–10.1)
CHLORIDE SERPL-SCNC: 101 MMOL/L (ref 100–108)
CK MB CFR SERPL CALC: 1.1 % (ref 0–4)
CK MB SERPL-MCNC: 1.9 NG/ML (ref 5–25)
CK SERPL-CCNC: 174 U/L (ref 26–192)
CO2 SERPL-SCNC: 25 MMOL/L (ref 21–32)
CREAT SERPL-MCNC: 0.7 MG/DL (ref 0.6–1.3)
DIFFERENTIAL METHOD BLD: ABNORMAL
EOSINOPHIL # BLD: 0.1 K/UL (ref 0–0.4)
EOSINOPHIL NFR BLD: 1 % (ref 0–5)
GLUCOSE SERPL-MCNC: 98 MG/DL (ref 74–99)
HCT VFR BLD AUTO: 31.1 % (ref 35–45)
HGB BLD-MCNC: 10 G/DL (ref 12–16)
LYMPHOCYTES # BLD: 0.7 K/UL (ref 0.9–3.6)
LYMPHOCYTES NFR BLD: 8 % (ref 21–52)
MCH RBC QN AUTO: 28.4 PG (ref 24–34)
MCHC RBC AUTO-ENTMCNC: 32.2 G/DL (ref 31–37)
MCV RBC AUTO: 88.4 FL (ref 74–97)
MONOCYTES # BLD: 1 K/UL (ref 0.05–1.2)
MONOCYTES NFR BLD: 11 % (ref 3–10)
NEUTS SEG # BLD: 7.5 K/UL (ref 1.8–8)
NEUTS SEG NFR BLD: 80 % (ref 40–73)
PLATELET # BLD AUTO: 208 K/UL (ref 135–420)
PMV BLD AUTO: 9.2 FL (ref 9.2–11.8)
POTASSIUM SERPL-SCNC: 4.5 MMOL/L (ref 3.5–5.5)
RBC # BLD AUTO: 3.52 M/UL (ref 4.2–5.3)
RBC MORPH BLD: ABNORMAL
SODIUM SERPL-SCNC: 135 MMOL/L (ref 136–145)
TROPONIN I SERPL-MCNC: <0.02 NG/ML (ref 0–0.04)
WBC # BLD AUTO: 9.3 K/UL (ref 4.6–13.2)

## 2018-11-08 PROCEDURE — 97530 THERAPEUTIC ACTIVITIES: CPT

## 2018-11-08 PROCEDURE — 65270000029 HC RM PRIVATE

## 2018-11-08 PROCEDURE — 82553 CREATINE MB FRACTION: CPT

## 2018-11-08 PROCEDURE — 97116 GAIT TRAINING THERAPY: CPT

## 2018-11-08 PROCEDURE — 74011250637 HC RX REV CODE- 250/637: Performed by: ORTHOPAEDIC SURGERY

## 2018-11-08 PROCEDURE — 80048 BASIC METABOLIC PNL TOTAL CA: CPT

## 2018-11-08 PROCEDURE — 74011250637 HC RX REV CODE- 250/637: Performed by: PHYSICIAN ASSISTANT

## 2018-11-08 PROCEDURE — 85025 COMPLETE CBC W/AUTO DIFF WBC: CPT

## 2018-11-08 PROCEDURE — 71045 X-RAY EXAM CHEST 1 VIEW: CPT

## 2018-11-08 PROCEDURE — 74011250636 HC RX REV CODE- 250/636: Performed by: PHYSICIAN ASSISTANT

## 2018-11-08 PROCEDURE — 93005 ELECTROCARDIOGRAM TRACING: CPT

## 2018-11-08 PROCEDURE — 36415 COLL VENOUS BLD VENIPUNCTURE: CPT

## 2018-11-08 PROCEDURE — 97161 PT EVAL LOW COMPLEX 20 MIN: CPT

## 2018-11-08 PROCEDURE — 77030012893

## 2018-11-08 RX ORDER — OXYCODONE AND ACETAMINOPHEN 5; 325 MG/1; MG/1
TABLET ORAL
Qty: 84 TAB | Refills: 0 | Status: SHIPPED | OUTPATIENT
Start: 2018-11-08 | End: 2021-07-26 | Stop reason: ALTCHOICE

## 2018-11-08 RX ORDER — HYDROCODONE BITARTRATE AND ACETAMINOPHEN 5; 325 MG/1; MG/1
1 TABLET ORAL
Status: DISCONTINUED | OUTPATIENT
Start: 2018-11-08 | End: 2018-11-10 | Stop reason: HOSPADM

## 2018-11-08 RX ORDER — GUAIFENESIN 100 MG/5ML
81 LIQUID (ML) ORAL 2 TIMES DAILY
Qty: 84 TAB | Refills: 0 | Status: SHIPPED | OUTPATIENT
Start: 2018-11-08 | End: 2021-07-26 | Stop reason: ALTCHOICE

## 2018-11-08 RX ADMIN — OXYCODONE HYDROCHLORIDE 5 MG: 5 TABLET ORAL at 09:47

## 2018-11-08 RX ADMIN — Medication 10 ML: at 14:02

## 2018-11-08 RX ADMIN — Medication 10 ML: at 22:07

## 2018-11-08 RX ADMIN — KETOROLAC TROMETHAMINE 15 MG: 30 INJECTION, SOLUTION INTRAMUSCULAR at 19:37

## 2018-11-08 RX ADMIN — KETOROLAC TROMETHAMINE 15 MG: 30 INJECTION, SOLUTION INTRAMUSCULAR at 09:48

## 2018-11-08 RX ADMIN — NIFEDIPINE 30 MG: 30 TABLET, FILM COATED, EXTENDED RELEASE ORAL at 09:48

## 2018-11-08 RX ADMIN — Medication 10 ML: at 07:48

## 2018-11-08 RX ADMIN — KETOROLAC TROMETHAMINE 15 MG: 30 INJECTION, SOLUTION INTRAMUSCULAR at 12:47

## 2018-11-08 RX ADMIN — Medication 2 G: at 02:23

## 2018-11-08 RX ADMIN — DOCUSATE SODIUM 100 MG: 100 CAPSULE, LIQUID FILLED ORAL at 17:25

## 2018-11-08 RX ADMIN — SODIUM CHLORIDE, SODIUM LACTATE, POTASSIUM CHLORIDE, AND CALCIUM CHLORIDE 100 ML/HR: 600; 310; 30; 20 INJECTION, SOLUTION INTRAVENOUS at 05:22

## 2018-11-08 RX ADMIN — OMEPRAZOLE 40 MG: 20 CAPSULE, DELAYED RELEASE ORAL at 09:48

## 2018-11-08 RX ADMIN — ACETAMINOPHEN 975 MG: 325 TABLET ORAL at 02:21

## 2018-11-08 RX ADMIN — HYDROCODONE BITARTRATE AND ACETAMINOPHEN 1 TABLET: 5; 325 TABLET ORAL at 20:06

## 2018-11-08 RX ADMIN — Medication 81 MG: at 09:47

## 2018-11-08 RX ADMIN — Medication 81 MG: at 22:06

## 2018-11-08 RX ADMIN — DOCUSATE SODIUM 100 MG: 100 CAPSULE, LIQUID FILLED ORAL at 09:48

## 2018-11-08 RX ADMIN — LEFLUNOMIDE 10 MG: 10 TABLET ORAL at 09:00

## 2018-11-08 RX ADMIN — OXYCODONE HYDROCHLORIDE 5 MG: 5 TABLET ORAL at 05:25

## 2018-11-08 RX ADMIN — FERROUS SULFATE TAB 325 MG (65 MG ELEMENTAL FE) 325 MG: 325 (65 FE) TAB at 07:48

## 2018-11-08 RX ADMIN — OXYCODONE HYDROCHLORIDE 10 MG: 5 TABLET ORAL at 14:00

## 2018-11-08 RX ADMIN — ACETAMINOPHEN 975 MG: 325 TABLET ORAL at 12:47

## 2018-11-08 RX ADMIN — DOCUSATE SODIUM 100 MG: 100 CAPSULE, LIQUID FILLED ORAL at 22:06

## 2018-11-08 RX ADMIN — ESCITALOPRAM OXALATE 10 MG: 10 TABLET ORAL at 22:06

## 2018-11-08 RX ADMIN — KETOROLAC TROMETHAMINE 15 MG: 30 INJECTION, SOLUTION INTRAMUSCULAR at 02:22

## 2018-11-08 NOTE — PROGRESS NOTES
Progress Note        Patient: Gabino Alexis MRN: [id number]  SSN: [id number]    YOB: 1933  Age: 80 y.o. Sex: female      1 Day Post-Op status post Procedure(s) (LRB):  RIGHT  KNEE COMPLEX PRIMARY TOTAL KNEE ARTHROPLASTY AND ALL INDICATED PROCEDURES (Right)    Admit Date: 2018  Admit Diagnosis: RIGHT KNEE OSTEOARTHRITIS  Total knee replacement status, right    Subjective:       Doing well. No complaints. No SOB. No Chest Pain. No Nausea or Vomiting. No problems eating or voiding. Objective:        Temp (24hrs), Av.3 °F (36.8 °C), Min:97.9 °F (36.6 °C), Max:99.1 °F (37.3 °C)    Body mass index is 24.26 kg/m². Patient Vitals for the past 12 hrs:   BP Temp Pulse Resp SpO2   18 0701 140/80 98.4 °F (36.9 °C) 79 16 97 %   18 0233 150/68 98.1 °F (36.7 °C) 81 16 95 %   18 2227 127/54 97.9 °F (36.6 °C) 77 18 95 %     No results for input(s): HGB, HCT, INR, NA, K, CL, CO2, BUN, CREA, GLU, HGBEXT, HCTEXT in the last 72 hours. No lab exists for component: INREXT    Physical Exam:  Vital Signs are Stable. No Acute Distress. Alert and Oriented. Negative Homans sign. Toes AROM Full. Neurovascular exam is normal.    Dressing is Clean, Dry, and Intact. Assessment/Plan:     1. Patient doing well. 2. Out of BED / PT / WBAT  3. DVT ppx: Mobilization, Ecotrin, BURTON hose, and mechanical compression  4. D/c planning    Continue PT/OT  Continue ROM    Discharge Plan: Home with Home Health tomorrow pending PT clearance and pain control with oral analgesia.     Signed By: Donald Barreto PA-C     2018

## 2018-11-08 NOTE — ROUTINE PROCESS
1823 Orders released. Room service called for tray by . 1900 - Patient arrives to unit at this time. Dual skin assessment completed with Willy Rosario RN. Patient is A/O x 4.  VS stable. . ACE dressing to R knee CDI. Pt denies numbness/tingling. BLE pulses palpable. Pain 2/10. Patient was oriented to the room to include use of call bell, meal ordering. Patient was given explanation of \" up for dinner\" program and has verbalized understanding. Bed in lowest position. Phone and call bell left within reach. Patient educated on need to call for assistance before getting OOB. Required documentation, care plan, education completed. Pt received FLU vaccination prior to admit. Pillow and extra blankets provided. Bedside and Verbal shift change report given to [address] [place] [address] by Silva Jane RN. Report included the following information SBAR, Kardex, OR Summary, Intake/Output and MAR.

## 2018-11-08 NOTE — PROGRESS NOTES
Problem: Mobility Impaired (Adult and Pediatric)  Goal: *Acute Goals and Plan of Care (Insert Text)  Physical Therapy Goals   Initiated 11/8/2018 and to be accomplished within 3-5 day(s)  1. Patient will move from supine <> sit with S in prep for out of bed activity and change of position. 2.  Patient will perform sit<> stand with S with LRAD in prep for transfers/ambulation. 3.  Patient will transfer from bed <> chair with S with LRAD for time up in chair for completion of ADL activity. 4.  Patient will ambulate 150 feet with LRAD/S for improved functional mobility/safe discharge. 5.  Patient will ascend/descend 3-5 stairs with handrails with minimal assistance/contact guard assist for home re-entry as needed. Outcome: Progressing Towards Goal  physical Therapy TREATMENT    Patient: Dandre Dee (6 y.o. female)  Date: 11/8/2018  Diagnosis: RIGHT KNEE OSTEOARTHRITIS  Total knee replacement status, right <principal problem not specified>  Procedure(s) (LRB):  RIGHT  KNEE COMPLEX PRIMARY TOTAL KNEE ARTHROPLASTY AND ALL INDICATED PROCEDURES (Right) 1 Day Post-Op  Precautions: Fall, WBAT   Chart, physical therapy assessment, plan of care and goals were reviewed. ASSESSMENT:  Pt agreeable to participate. CGA for RLE management for bed mobility. Pt amb 15' with RW Min A. Pt became dizziness and \"hot\". Seated rest needed. Vital taken. HR elevated 120s after 1 mins of seated rest. On intial standing, pt balance unsteady and pt posterior leaning. Manual A to shift weight anterior to midline. Recommend BSC for safety. Nursing aware. Recommended rehab placement due above, and discussed above with CG/family. Cont POC.    Progression toward goals:  []      Improving appropriately and progressing toward goals  [x]      Improving slowly and progressing toward goals  []      Not making progress toward goals and plan of care will be adjusted     PLAN:  Patient continues to benefit from skilled intervention to address the above impairments. Continue treatment per established plan of care. Discharge Recommendations:  Rehab or Home Health pending progress   Further Equipment Recommendations for Discharge:  rolling walker     SUBJECTIVE:   Patient stated  I need to use the bathroom     OBJECTIVE DATA SUMMARY:   Critical Behavior:  Neurologic State: Alert  Orientation Level: Oriented X4  Functional Mobility Training:  Bed Mobility:     Supine to Sit: Contact guard assistance  Sit to Supine: Contact guard assistance  Transfers:  Sit to Stand: Minimum assistance  Stand to Sit: Contact guard assistance  Balance:  Sitting: Intact  Standing: Intact; With support  Ambulation/Gait Training:  Distance (ft): 30 Feet (ft)  Assistive Device: Walker, rolling;Gait belt  Ambulation - Level of Assistance: Contact guard assistance   Gait Description (WDL): Exceptions to WDL  Gait Abnormalities: Antalgic;Decreased step clearance; Step to gait  Right Side Weight Bearing: As tolerated  Base of Support: Shift to left  Stance: Right decreased  Speed/Mirlande: Slow  Step Length: Left shortened;Right shortened  Swing Pattern: Left asymmetrical;Right asymmetrical    Therapeutic Exercises:   AP, QS and heel slides x5    Pain:  Pain Scale 1: Numeric (0 - 10)  Pain Intensity 1: 3  Pain Location 1: Knee  Pain Orientation 1: Right  Pain Description 1: Aching  Pain Intervention(s) 1: Ice;Medication (see MAR)  Activity Tolerance:   Fair-    After treatment:   [x] Patient left in no apparent distress sitting up in chair  [] Patient left in no apparent distress in bed  [x] Call bell left within reach  [x] Nursing notified  [x] Caregiver present  [] Bed alarm activated      Shania Victoria PTA   Time Calculation: 43 mins

## 2018-11-08 NOTE — PROGRESS NOTES
Plan: [facility]  vs [facility]F    Transition of Care ([place]) Plan:     Chart reviewed, met with pt and family in room. Pt lives with spouse, daughter in room. Pt planning discharge tomorrow vs Saturday to either home or SNF. FOC offered, pt chose [facility] [phone] or [facility] or [facility] for SNF if needed; referrals placed with CMS. RW ordered through [facility] for delivery to pt room tomorrow in anticipation pt will clear PT for discharge home. CM following to finalize SNF plan for Saturday 11/10. [place] Transportation:   How is patient being transported at discharge? Family/Friend      When? Once cleared by Therapy between 12-2pm     Is transport scheduled? N/A      Follow-up appointment and transportation:   PCP/Specialist?  See AVS for Appointment         Who is transporting to the follow-up appointment? Family/Friend      Is transport for follow up appointment scheduled? N/A    Communication plan (with patient/family): Who is being called? Patient or Next of Kin? Responsible party? Patient      What number(s) is to be used? See Facesheet      What service provider is calling for [facility] services? When are they calling? 24-48 hours following discharge    Readmission Risk? (Green/Low; Yellow/Moderate; Red/High):  Green    Care Management Interventions  PCP Verified by CM:  Yes  Transition of Care Consult (CM Consult): Home Health, SNF  [address]: No  Reason Outside [place]: Physician referred to specific agency([facility])  Partner SNF: No  Reason Why Partner SNF Not Chosen: Location([facility], [facility])  Discharge Durable Medical Equipment: Yes  Occupational Therapy Consult: Yes  Current Support Network: Lives with Spouse, Family Lives Nearby  Confirm Follow Up Transport: Family  Plan discussed with Pt/Family/Caregiver: Yes  Freedom of Choice Offered: Yes  Discharge Location  Discharge Placement: Home with home health

## 2018-11-08 NOTE — PROGRESS NOTES
Bedside and verbal report given to [facility], RN, with use of kardex. Rounded on pt Q1 hour throughout shift, no s/s distress nor discomfort noted, call bell in reach.

## 2018-11-08 NOTE — PROGRESS NOTES
Patient was visited by Spiritual care Volunteer who offered Pastoral Care support, and provided Spiritual Care brochure and Spiritual wellness packet. Chaplains remain available to provide spiritual support as needed and requested. Rev.  Anyi Kolb

## 2018-11-08 NOTE — ROUTINE PROCESS
26: Received report from [facility]. N. RN. Assumed pt care at this time. 4202: Assessment completed. Patient is A&OX4. Patient is calm and cooperative. Family at bedside. Patient's oral mucosa pink and moist, strong  on both upper extremities. Lung sound clear, not appear distress. Bowel sounds acitve. Pedal pulses are palpable, no complain of any numbness or tingling. IV site dry and dressing intact. Removed compression dressing at this time, Mepilex underneath is clean and dry, Ice is applied. SCD and Brody hose are applied. Pain is 6/10. Circular redness noted to the buttock area and pt said that she got Psoriasis. bed is in lower position, call bell and personal items are within reach. Pain regimen and activities are educated, educated pt to call when getting up to prevent fall. Pt verbalized understanding. 1757: PT reported that pt desaturated with exertion, require frequent clues, tachycardia during PT session. Assessed pt at this time and irreuglar heart beat note. Pt said that she had Mitral Valve regurgitation and aortic valves issue. She said that she visited her cardiology Q6 months. Spoke with Dr Octavia Gomez at this time. He said discontinue tylenol and oxycodone, order Norco 5-325mg [id number], 12 lead EKG stat, and hospitalist consult. He said that he would contact the hospital doctor. 1847: Page Dr Doreen Morales at this time about the EKG result.

## 2018-11-08 NOTE — PROGRESS NOTES
Problem: Mobility Impaired (Adult and Pediatric)  Goal: *Acute Goals and Plan of Care (Insert Text)  Physical Therapy Goals   Initiated 11/8/2018 and to be accomplished within 3-5 day(s)  1. Patient will move from supine <> sit with S in prep for out of bed activity and change of position. 2.  Patient will perform sit<> stand with S with LRAD in prep for transfers/ambulation. 3.  Patient will transfer from bed <> chair with S with LRAD for time up in chair for completion of ADL activity. 4.  Patient will ambulate 150 feet with LRAD/S for improved functional mobility/safe discharge. 5.  Patient will ascend/descend 3-5 stairs with handrails with minimal assistance/contact guard assist for home re-entry as needed. Outcome: Progressing Towards Goal  physical Therapy EVALUATION    Patient: Miller Proctor (50 y.o. female)  Date: 11/8/2018  Primary Diagnosis: RIGHT KNEE OSTEOARTHRITIS  Total knee replacement status, right  Procedure(s) (LRB):  RIGHT  KNEE COMPLEX PRIMARY TOTAL KNEE ARTHROPLASTY AND ALL INDICATED PROCEDURES (Right) 1 Day Post-Op   Precautions:  Fall, WBAT    ASSESSMENT :  Based on the objective data described below, the patient presents with decrease independence w/ bed mobility, transfers, gait, and step negotiation. Pt seen in supine prior to session w/  IV connected. Pt reported 6/10 pain at this time in R knee. Pt has no c/o lightheadedness, dizziness or nausea. Upon standing pt demonstrates unsteadiness demonstrating a posterior lean and requires assistance to maintain stability. Pt able to ambulate to the restroom w/ RW/GB to perform toileting task w/o any assistance for self cleaning. Pt able to ambulate a total of 60 ft w/ RW/GB and demonstrates a kyphotic posture, a step to, antalgic gait, decrease skylar, decrease stride length, and decrease step clearance.  Pt transferred back to room where pt was left in supine after session, call bell and tray in reach, B/L SCDs donned, vitals assessed WNLs, nurse notified after session. Patient will benefit from skilled intervention to address the above impairments. Patients rehabilitation potential is considered to be Good  Factors which may influence rehabilitation potential include:   []         None noted  []         Mental ability/status  []         Medical condition  []         Home/family situation and support systems  []         Safety awareness  []         Pain tolerance/management  []         Other:      PLAN :  Recommendations and Planned Interventions:  []           Bed Mobility Training             []    Neuromuscular Re-Education  []           Transfer Training                   []    Orthotic/Prosthetic Training  []           Gait Training                          []    Modalities  []           Therapeutic Exercises          []    Edema Management/Control  []           Therapeutic Activities            []    Patient and Family Training/Education  []           Other (comment):    Frequency/Duration: Patient will be followed by physical therapy twice daily to address goals. Discharge Recommendations: Home Health  Further Equipment Recommendations for Discharge: rolling walker     SUBJECTIVE:   Patient stated I feel a little better after walking.     OBJECTIVE DATA SUMMARY:     Past Medical History:   Diagnosis Date    Arthritis     Asthma     Cancer ([facility] [address] 2013    melanoma 3rd finger left hand    Chronic pain     lower back and right knee    Psoriatic arthritis ([facility] [address]     H/O in the past    Rheumatic fever     Subdural hematoma ([facility] [address] 2013     Past Surgical History:   Procedure Laterality Date    HX KNEE ARTHROSCOPY Left 2008    HX OTHER SURGICAL  2013    Exc melanoma 3rd finger left hand     Barriers to Learning/Limitations: yes;  physical  Compensate with: Verbal Cues and Tactile Cues  Prior Level of Function/Home Situation:   Home Situation  Home Environment: Private residence  # Steps to Enter: 3  Rails to Enter: Yes  [employer] Rails : Bilateral  One/Two Story Residence: Two story, live on 1st floor  Living Alone: Yes  Support Systems: Spouse/Significant Other/Partner  Patient Expects to be Discharged to[de-identified] Rehabilitation facility  Current DME Used/Available at Home: ganga Marrero  Critical Behavior:  Neurologic State: Alert  Orientation Level: Oriented X4  Psychosocial  Purposeful Interaction: Yes  Pt Identified Daily Priority: Clinical issues (comment)  [employer] Process: Nurture loving kindness;Establish trust;Teaching/learning;Create healing environment;Supportive expression  Caring Interventions: Reassure  Reassure: Informing;Caring rounds  Strength:    Strength: Generally decreased, functional  Tone & Sensation:   Tone: Normal  Sensation: Intact  Range Of Motion:  AROM: Generally decreased, functional  Functional Mobility:  Bed Mobility:  Sit to Supine: Contact guard assistance  Transfers:  Sit to Stand: Minimum assistance  Stand to Sit: Contact guard assistance;Minimum assistance  Balance:   Sitting: Intact  Standing: Intact; With support  Ambulation/Gait Training:  Distance (ft): 60 Feet (ft)  Assistive Device: Gait belt;Walker, rolling  Ambulation - Level of Assistance: Contact guard assistance  Gait Description (WDL): Exceptions to WDL  Gait Abnormalities: Antalgic;Decreased step clearance; Step to gait  Right Side Weight Bearing: As tolerated  Base of Support: Shift to left  Stance: Right decreased  Speed/Mirlande: Slow  Step Length: Left shortened;Right shortened  Swing Pattern: Left asymmetrical;Right asymmetrical  Therapeutic Exercises: Therex packet handed to pt upon assessment  Pain:  Pain Scale 1: Numeric (0 - 10)  Pain Intensity 1: 6  Pain Location 1: Knee  Pain Orientation 1: Right  Pain Description 1: Aching  Pain Intervention(s) 1: Ice;Medication (see MAR)  Activity Tolerance:   Fair  Please refer to the flowsheet for vital signs taken during this treatment.   After treatment:   []         Patient left in no apparent distress sitting up in chair  [x]         Patient left in no apparent distress in bed  [x]         Call bell left within reach  [x]         Nursing notified  []         Caregiver present  []         Bed alarm activated    COMMUNICATION/EDUCATION:   [x]         Fall prevention education was provided and the patient/caregiver indicated understanding. [x]         Patient/family have participated as able in goal setting and plan of care. [x]         Patient/family agree to work toward stated goals and plan of care. []         Patient understands intent and goals of therapy, but is neutral about his/her participation. []         Patient is unable to participate in goal setting and plan of care. Thank you for this referral.  Tosha Thrasher, PT   Time Calculation: 23 mins     Mobility:  Current  CJ= 20-39%   Goal  CI= 1-19%. The severity rating is based on the Other Based on functional assessment

## 2018-11-08 NOTE — PROGRESS NOTES
1200: Bedside and Verbal shift change report received from DULCE Hernandez RN Report included the following information SBAR, Kardex, MAR and Recent Results.

## 2018-11-08 NOTE — PROGRESS NOTES
Problem: Falls - Risk of  Goal: *Absence of Falls  Document Bruno Fall Risk and appropriate interventions in the flowsheet.   Outcome: Progressing Towards Goal  Fall Risk Interventions:  Mobility Interventions: Patient to call before getting OOB         Medication Interventions: Patient to call before getting OOB, Teach patient to arise slowly    Elimination Interventions: Call light in reach, Toileting schedule/hourly rounds    History of Falls Interventions: Door open when patient unattended

## 2018-11-08 NOTE — PROGRESS NOTES
SHIFT SUMMARY:   Pt has been stable. during the night. Pain managed as ordered no major concerns to report. Bedside shift change report given to Lexi Lundberg. Report included the following information SBAR, Kardex, Intake/Output, MAR and Recent Results.

## 2018-11-09 PROCEDURE — 74011250636 HC RX REV CODE- 250/636: Performed by: PHYSICIAN ASSISTANT

## 2018-11-09 PROCEDURE — 97116 GAIT TRAINING THERAPY: CPT

## 2018-11-09 PROCEDURE — 65270000029 HC RM PRIVATE

## 2018-11-09 PROCEDURE — 74011250636 HC RX REV CODE- 250/636: Performed by: HOSPITALIST

## 2018-11-09 PROCEDURE — 74011250637 HC RX REV CODE- 250/637: Performed by: ORTHOPAEDIC SURGERY

## 2018-11-09 PROCEDURE — 74011250637 HC RX REV CODE- 250/637: Performed by: PHYSICIAN ASSISTANT

## 2018-11-09 PROCEDURE — 97530 THERAPEUTIC ACTIVITIES: CPT

## 2018-11-09 RX ORDER — FUROSEMIDE 10 MG/ML
20 INJECTION INTRAMUSCULAR; INTRAVENOUS ONCE
Status: COMPLETED | OUTPATIENT
Start: 2018-11-09 | End: 2018-11-09

## 2018-11-09 RX ADMIN — ONDANSETRON 4 MG: 2 INJECTION INTRAMUSCULAR; INTRAVENOUS at 02:59

## 2018-11-09 RX ADMIN — HYDROCODONE BITARTRATE AND ACETAMINOPHEN 1 TABLET: 5; 325 TABLET ORAL at 15:05

## 2018-11-09 RX ADMIN — HYDROCODONE BITARTRATE AND ACETAMINOPHEN 1 TABLET: 5; 325 TABLET ORAL at 19:36

## 2018-11-09 RX ADMIN — HYDROCODONE BITARTRATE AND ACETAMINOPHEN 1 TABLET: 5; 325 TABLET ORAL at 11:47

## 2018-11-09 RX ADMIN — Medication 81 MG: at 21:02

## 2018-11-09 RX ADMIN — OMEPRAZOLE 40 MG: 20 CAPSULE, DELAYED RELEASE ORAL at 08:59

## 2018-11-09 RX ADMIN — ESCITALOPRAM OXALATE 10 MG: 10 TABLET ORAL at 21:02

## 2018-11-09 RX ADMIN — FERROUS SULFATE TAB 325 MG (65 MG ELEMENTAL FE) 325 MG: 325 (65 FE) TAB at 07:06

## 2018-11-09 RX ADMIN — HYDROCODONE BITARTRATE AND ACETAMINOPHEN 1 TABLET: 5; 325 TABLET ORAL at 07:06

## 2018-11-09 RX ADMIN — Medication 10 ML: at 21:03

## 2018-11-09 RX ADMIN — Medication 81 MG: at 09:00

## 2018-11-09 RX ADMIN — NIFEDIPINE 30 MG: 30 TABLET, FILM COATED, EXTENDED RELEASE ORAL at 09:00

## 2018-11-09 RX ADMIN — FUROSEMIDE 20 MG: 10 INJECTION, SOLUTION INTRAMUSCULAR; INTRAVENOUS at 09:00

## 2018-11-09 RX ADMIN — Medication 10 ML: at 07:06

## 2018-11-09 NOTE — PROGRESS NOTES
Problem: Mobility Impaired (Adult and Pediatric)  Goal: *Acute Goals and Plan of Care (Insert Text)  Physical Therapy Goals   Initiated 11/8/2018 and to be accomplished within 3-5 day(s)  1. Patient will move from supine <> sit with S in prep for out of bed activity and change of position. 2.  Patient will perform sit<> stand with S with LRAD in prep for transfers/ambulation. 3.  Patient will transfer from bed <> chair with S with LRAD for time up in chair for completion of ADL activity. 4.  Patient will ambulate 150 feet with LRAD/S for improved functional mobility/safe discharge. 5.  Patient will ascend/descend 3-5 stairs with handrails with minimal assistance/contact guard assist for home re-entry as needed. Outcome: Progressing Towards Goal  physical Therapy TREATMENT    Patient: Janene Denver (89 y.o. female)  Date: 11/9/2018  Diagnosis: RIGHT KNEE OSTEOARTHRITIS  Total knee replacement status, right <principal problem not specified>  Procedure(s) (LRB):  RIGHT  KNEE COMPLEX PRIMARY TOTAL KNEE ARTHROPLASTY AND ALL INDICATED PROCEDURES (Right) 2 Days Post-Op  Precautions: Fall, WBAT   Chart, physical therapy assessment, plan of care and goals were reviewed. PLOF: ambulatory with a cane, has a rollator and w/c   ASSESSMENT:  Increased time needed to sit up EOB. Multiple attempts for sit to stand with bed elevated with each attempt. Ambulated 90ft total with RW, step gt pattern, very short shuffling steps, very slow pace, no LOB, path deviations to the R, Shayna needed for RW mgmt. Pt left sitting in chair to eat lunch. Education: RW mgmt and safety  Progression toward goals:  []      Improving appropriately and progressing toward goals  [x]      Improving slowly and progressing toward goals  []      Not making progress toward goals and plan of care will be adjusted     PLAN:  Patient continues to benefit from skilled intervention to address the above impairments.   Continue treatment per established plan of care. Discharge Recommendations:  Skilled Nursing Facility  Further Equipment Recommendations for Discharge:  RW delivered     SUBJECTIVE:   Patient stated Ok.     OBJECTIVE DATA SUMMARY:   Critical Behavior:  Neurologic State: Alert, Appropriate for age  Orientation Level: Appropriate for age, Oriented X4  Functional Mobility Training:  Bed Mobility:  Supine to Sit: Minimum assistance; Additional time  Scooting: Contact guard assistance  Transfers:  Sit to Stand: Minimum assistance  Stand to Sit: Contact guard assistance  Balance:  Sitting: Intact  Standing: Intact; With support  Standing - Static: Fair  Standing - Dynamic : Fair  Ambulation/Gait Training:  Distance (ft): 45 Feet (ft)(x2)  Assistive Device: Walker, rolling;Gait belt  Ambulation - Level of Assistance: Contact guard assistance;Minimal assistance  Gait Abnormalities: Antalgic;Decreased step clearance; Step to gait  Right Side Weight Bearing: As tolerated  Base of Support: Widened  Stance: Right decreased  Speed/Mirlande: Slow  Step Length: Right shortened;Left shortened  Swing Pattern: Right asymmetrical  GCODE:Mobility  Current  CJ= 20-39%   Goal  CI= 1-19%. The severity rating is based on the Level of Assistance required for Functional Mobility and ADLs. Pain:  Pre:3  Post:4  Pain Scale 1: Numeric (0 - 10)    Pain Location 1: Leg;Knee  Pain Orientation 1: Anterior  Pain Description 1: Aching; Sore    Activity Tolerance:   Fair  Please refer to the flowsheet for vital signs taken during this treatment.   After treatment:   [x] Patient left in no apparent distress sitting up in chair  [] Patient left in no apparent distress in bed  [x] Call bell left within reach  [] Nursing notified  [x] Caregiver present  [] Bed alarm activated      Michelle Spencer PTA   Time Calculation: 34 mins

## 2018-11-09 NOTE — PROGRESS NOTES
Hospitalist Progress Note    Patient: Eleanor Alaniz MRN: [id number]  CSN: [id number]    YOB: 1933  Age: 80 y.o. Sex: female    DOA: 11/7/2018 LOS:  LOS: 2 days          Chief Complaint:    SOB       Assessment/Plan     I did a medical consult last night-it is not transcribed still this am    She was seen for SOB while doing PT and dizziness  Tachycardia-self limited and EKG normal, resolved  Aortic and mitral valve disease  S/p right TKR    Labs reviewed  CXR showed minimal effusion, appears asymptomatic this am  Ho hypoxia noted  One dose IV lasix for her    See how she does with PT today and expect that if she does acceptably and VS are stable she could d/c as per orthopedics    Disposition :  Patient Active Problem List   Diagnosis Code    Total knee replacement status, right Z96.651    Tachycardia R00.0    SOB (shortness of breath) R06.02       Subjective:    I feel ok  Denies CP, SOB, nausea, vomiting, palpitations    Review of systems:    Constitutional: denies fevers, chills, myalgias  Gastrointestinal: denies nausea, vomiting, diarrhea      Vital signs/Intake and Output:  Visit Vitals  /74   Pulse 96   Temp 98.8 °F (37.1 °C)   Resp 15   Ht 4' 11\" (1.499 m)   Wt 54.5 kg (120 lb 2 oz)   SpO2 97%   BMI 24.26 kg/m²     Current Shift:  No intake/output data recorded. Last three shifts:  11/07 1901 - 11/09 0700  In: 2646 [P.O.:1120;  I.V.:1526]  Out: 2150 [Urine:2150]    Exam:    General: Well developed, alert, NAD, OX3  CVS:Regular rate and rhythm, no M/R/G, S1/S2 heard, no thrill  Lungs:Clear to auscultation bilaterally, no wheezes, rhonchi, or rales  Abdomen: Soft, Nontender, No distention, Normal Bowel sounds, No hepatomegaly  Extremities: No C/C/E, pulses palpable 2+, right knee dressed  Neuro:grossly normal , follows commands  Psych:appropriate                Labs: Results:       Chemistry Recent Labs     11/08/18  1854   GLU 98   *   K 4.5      CO2 25   BUN 12 CREA 0.70   CA 7.6*   AGAP 9   BUCR 17      CBC w/Diff Recent Labs     11/08/18  1854   WBC 9.3   RBC 3.52*   HGB 10.0*   HCT 31.1*      GRANS 80*   LYMPH 8*   EOS 1      Cardiac Enzymes Recent Labs     11/08/18  1854      CKND1 1.1      Coagulation No results for input(s): PTP, INR, APTT in the last 72 hours. No lab exists for component: INREXT    Lipid Panel No results found for: CHOL, CHOLPOCT, CHOLX, CHLST, CHOLV, 685949, HDL, LDL, LDLC, DLDLP, [id number], VLDLC, VLDL, TGLX, TRIGL, TRIGP, TGLPOCT, CHHD, CHHDX   BNP No results for input(s): BNPP in the last 72 hours. Liver Enzymes No results for input(s): TP, ALB, TBIL, AP, SGOT, GPT in the last 72 hours.     No lab exists for component: DBIL   Thyroid Studies No results found for: T4, T3U, TSH, TSHEXT     Procedures/imaging: see electronic medical records for all procedures/Xrays and details which were not copied into this note but were reviewed prior to creation of Ken Alcantar MD

## 2018-11-09 NOTE — PROGRESS NOTES
Problem: Falls - Risk of  Goal: *Absence of Falls  Document Bruno Fall Risk and appropriate interventions in the flowsheet.   Outcome: Progressing Towards Goal  Fall Risk Interventions:  Mobility Interventions: Patient to call before getting OOB, PT Consult for mobility concerns, PT Consult for assist device competence, Strengthening exercises (ROM-active/passive), Utilize walker, cane, or other assistive device         Medication Interventions: Assess postural VS orthostatic hypotension, Patient to call before getting OOB, Teach patient to arise slowly    Elimination Interventions: Call light in reach, Patient to call for help with toileting needs, Toilet paper/wipes in reach, Toileting schedule/hourly rounds    History of Falls Interventions: Door open when patient unattended

## 2018-11-09 NOTE — PROGRESS NOTES
Problem: Falls - Risk of  Goal: *Absence of Falls  Document Bruno Fall Risk and appropriate interventions in the flowsheet.   Outcome: Progressing Towards Goal  Fall Risk Interventions:  Mobility Interventions: Communicate number of staff needed for ambulation/transfer, Patient to call before getting OOB, Utilize walker, cane, or other assistive device         Medication Interventions: Patient to call before getting OOB, Teach patient to arise slowly    Elimination Interventions: Call light in reach, Patient to call for help with toileting needs    History of Falls Interventions: Consult care management for discharge planning

## 2018-11-09 NOTE — PROGRESS NOTES
Progress Note        Patient: Dwight Agudelo MRN: [id number]  SSN: [id number]    YOB: 1933  Age: 80 y.o. Sex: female      2 Days Post-Op status post Procedure(s) (LRB):  RIGHT  KNEE COMPLEX PRIMARY TOTAL KNEE ARTHROPLASTY AND ALL INDICATED PROCEDURES (Right)    Admit Date: 2018  Admit Diagnosis: RIGHT KNEE OSTEOARTHRITIS  Total knee replacement status, right    Subjective:       Doing well. No complaints. No SOB. No Chest Pain. No Nausea or Vomiting. No problems eating or voiding. Objective:        Temp (24hrs), Av.4 °F (36.9 °C), Min:97.7 °F (36.5 °C), Max:98.8 °F (37.1 °C)    Body mass index is 24.26 kg/m². Patient Vitals for the past 12 hrs:   BP Temp Pulse Resp SpO2   18 0718 149/72 98.8 °F (37.1 °C) 98 15 92 %   18 0250 143/71 98.8 °F (37.1 °C) 93 15 94 %   18 2320 132/78 98 °F (36.7 °C) 89 15 91 %     Recent Labs     18  1854   HGB 10.0*   HCT 31.1*   *   K 4.5      CO2 25   BUN 12   CREA 0.70   GLU 98       Physical Exam:  Vital Signs are Stable. No Acute Distress. Alert and Oriented. Negative Homans sign. Toes AROM Full. Neurovascular exam is normal.    Dressing is Clean, Dry, and Intact. Assessment/Plan:     1. Patient doing well. Feels she would like to go to SNF tomorrow. 2. Out of BED / PT / WBAT  3. DVT ppx: Mobilization, Ecotrin, BURTON hose, and mechanical compression  4.  D/c planning; hard scripts in chart    Continue PT/OT  Continue ROM    Discharge Plan: SNF tomorrow     Signed By: Rosa López PA-C     2018

## 2018-11-09 NOTE — ROUTINE PROCESS
Bedside and Verbal shift change report given to Stanford GARCIA (oncoming nurse) by Safia Mazariegos W.RN (offgoing nurse). Report included the following information SBAR, Kardex, Intake/Output, MAR and Alarm Parameters .

## 2018-11-09 NOTE — ROUTINE PROCESS
Bedside and Verbal shift change report given to Desmond Schwarz RN by Michelle Membreno. Report included the following information SBAR, Kardex, OR Summary, Intake/Output and MAR.

## 2018-11-09 NOTE — PROGRESS NOTES
1945 - Bedside report received from [facility]. Patient in bed. Pain 5/10.     2008 - Patient in bed at this time. IV to L FA  intact and patent. Plexis compression device bilaterally. TEDs to BLE. Dressing to R knee CDI. + CMS. Pt A & O x 4. LS clear, on RA. Abdomen soft, NT and ND. + BS to all 4 quadrants. Denies nausea. Pain 5/10. Call light within reach. Medications given. Potential side effects explained to patient, patient verbalizes understanding, opportunities for questions provided. Patient stable, No apparent distress at this time, bed in locked position, call bell and phone within reach. 0300-Pt with nausea, no emesis. Zofran given. Potential side effects explained to patient, patient verbalizes understanding, opportunities for questions provided. Patient stable, No apparent distress at this time, bed in locked position, call bell and phone within reach. Pt had uneventful shift. Uses IS every hour while awake. Pt ambulated with assistance with a walker. Pain remained well-controlled with medication. No issues/concerns at this time.  Call bell within reach

## 2018-11-09 NOTE — ROUTINE PROCESS
Bedside and Verbal shift change report given to EDER Augustine RN (oncoming nurse) by Juan Daniel Gilbert RN (offgoing nurse). Report included the following information SBAR, Kardex, Intake/Output and MAR.

## 2018-11-09 NOTE — PROGRESS NOTES
Family will transport pt to [address] at [facility] [address] PAT[facility] [place] report to [phone]

## 2018-11-09 NOTE — PROGRESS NOTES
0846 - Patient in chair at this time. A/O x 4. IV to left forearm  intact and patent. Plexis to right foot and SCD to left leg, TEDs in place to bilateral legs. Mepilex dressing to right knee CDI. No numbness/tingling. Pedal pulses palpable. Lungs CTA. Bowel sounds active to all quadrants. Pain 3/10.     1148-[place] given for pain rated at 6/10.    1230-Pain decreased to 3/10. Shift summary-Patient to go to rehab tomorrow. Up with walker and one assist. PT says better balance today and better blood pressure. Pain controlled with PRN pain medication. Tolerating meals.

## 2018-11-09 NOTE — PROGRESS NOTES
Cm spoke with pt at bedside planning for discharge tomorrow [facility] once pt clears from PT tomorrow, facility would like to accept pt no later than 4pm,plans to have family provide transportation,please send d/c summary and  hard scripts with pt, weekend cm available if needed.

## 2018-11-09 NOTE — CONSULTS
[address] [facility]    Xena Soliman  MR#: [id number]  : 1933  ACCOUNT #: [de-identified]   DATE OF SERVICE: 2018    REASON FOR MEDICAL CONSULTATION:  Tachycardia and shortness of breath with physical therapy. HISTORY OF PRESENT ILLNESS:  This is a delightful 79-year-old  female who had a right total knee replacement yesterday with Dr. Efraín Walker. During her PT today, the nurse noted that she had an irregular heart rate. She became weak and dizzy when she stood and she desaturated with exertion. The patient says that she feels like the pain medicine was making her drowsy and sick. That was changed. Her  states she looks much better this evening. The patient also states she feels much better. She is eating her dinner as I entered the room. She denies any chest pain or palpitations. She denies any shortness of breath, coughing or wheezing. She has no leg swelling. An EKG was done stat, which shows normal sinus rhythm. PAST MEDICAL HISTORY:  Aortic and mitral valve disease, asthma, melanoma, depression, osteoarthritis, rheumatoid arthritis, spinal stenosis. She also had a traumatic brain injury . SOCIAL HISTORY:  , nonsmoker, quit over 15 years ago after 5 pack years. She has 3 glasses of wine a week. ALLERGIES:  SHE HAS ALLERGIES TO SULFA AND INDERAL. SURGICAL HISTORY:  Significant for cataracts. MEDICATIONS:  At home include alpha lipoic acid, calcium and vitamin D, probiotic, Dulera inhaler, iron, Lexapro, multivitamin, nifedipine, Prilosec, ProAir inhaler, Tylenol, vitamin C, vitamin D. FAMILY HISTORY:  Brother had coronary disease. PRIMARY CARE PHYSICIAN:  Dr. Mercy Zarate:  GENERAL:  She denies any fevers or chills. RESPIRATORY:  She denies wheezing, coughing or shortness of breath currently. CARDIOVASCULAR:  No chest pain, palpitations, or leg swelling.   GASTROINTESTINAL: No nausea, vomiting, diarrhea. She denies any abdominal pain. GENITOURINARY:  She has urinated without difficulty today. She denies any dysuria or hematuria. HEMATOLOGIC:  No bleeding or bruisability. MUSCULOSKELETAL:  No uncontrolled pain in the right knee currently. PHYSICAL EXAMINATION:    GENERAL:  The patient is a delightful 80-year-old thin  female in no acute distress. She is oriented x3. She is resting comfortably in the hospital bed, eating her dinner and visiting with her family. VITAL SIGNS:  Temperature is 98.7, pulse is 89, respiratory rate of 16, SpO2 is 93% on room air. HEENT:  Sclerae are anicteric. Conjunctivae pink. NECK:  Supple, no JVD or thyromegaly. LUNGS:  Clear bilaterally. No rales, rhonchi, wheezes. HEART:  Regular rate and rhythm. No murmur, rub or gallop. ABDOMEN:  Soft, nontender, no distention. LOWER EXTREMITIES:  Right knee is dressed. Distal pulses are palpable. There is no significant edema on the right lower extremity. No current labs at this point. We are awaiting a stat chest x-ray, which I ordered. I did review the EKG that she had earlier tonight. It showed normal sinus rhythm, left axis deviation. Compared to her preop EKG there was no significant change. Her most recent vital signs are stable. I have ordered cardiac enzymes, CBC and BMP and I will follow with the results of those. ASSESSMENT:  1. Tachycardia and dyspnea on exertion with PT today. 2.  Possible narcotic effect from postoperative pain medication. 3.  History of rheumatoid arthritis. 4.  Status post right knee replacement. 5.  History of aortic and mitral valve disease and in discussion about that, she states those are mostly asymptomatic for her. She does see a cardiologist once to twice a year about that, but she has had no notable issues from that and she does not take blood thinners for that issue. She has not had a valve replacement.     So, in summary, we will keep an eye on her lab results this evening. The nurse can do her routine vital signs. She can be reassessed with physical therapy tomorrow. I agree that keeping her off the stronger narcotics is most appropriate. She now has Norco ordered as needed every 4 hours. She has no symptoms referable to her cardiopulmonary system at this point in time. She is not hypoxic. She is not currently tachycardic and I would continue her on her baby aspirin that is being used for DVT prophylaxis. She has her inhaler daily for her history of asthma. Continue her antidepressant Lexapro, her iron tablet, her Arava for history of RA, her Procardia that she takes daily, and we will follow her up in the morning as well and see how she is feeling then. With that said, thank you very much for the consultation.       MD FLO Eldridge / THALIA  D: 11/08/2018 18:56     T: 11/09/2018 00:12  JOB #: [id number]  CC: Mykel Lopez MD

## 2018-11-09 NOTE — PROGRESS NOTES
Problem: Mobility Impaired (Adult and Pediatric)  Goal: *Acute Goals and Plan of Care (Insert Text)  Physical Therapy Goals   Initiated 11/8/2018 and to be accomplished within 3-5 day(s)  1. Patient will move from supine <> sit with S in prep for out of bed activity and change of position. 2.  Patient will perform sit<> stand with S with LRAD in prep for transfers/ambulation. 3.  Patient will transfer from bed <> chair with S with LRAD for time up in chair for completion of ADL activity. 4.  Patient will ambulate 150 feet with LRAD/S for improved functional mobility/safe discharge. 5.  Patient will ascend/descend 3-5 stairs with handrails with minimal assistance/contact guard assist for home re-entry as needed. Outcome: Progressing Towards Goal  physical Therapy TREATMENT    Patient: Stefani Jane (73 y.o. female)  Date: 11/9/2018  Diagnosis: RIGHT KNEE OSTEOARTHRITIS  Total knee replacement status, right <principal problem not specified>  Procedure(s) (LRB):  RIGHT  KNEE COMPLEX PRIMARY TOTAL KNEE ARTHROPLASTY AND ALL INDICATED PROCEDURES (Right) 2 Days Post-Op  Precautions: Fall, WBAT   Chart, physical therapy assessment, plan of care and goals were reviewed. ASSESSMENT:  Dynamic standing balance much improved today. CGA still required for safety. Also, extensive VCs for correct RW management and sequencing. Pt presents with decrease tolerance to activity and fatigues quickly. Pt amb 30' x2 with RW CGA. Dynamic standing at sink performing ADLs. Rehab remains appropriate to maximize functional independence before returning home. Pt left sitting in chair. Vitals taken and WNLs. Encouraged IS. Cont POC.      Progression toward goals:  []      Improving appropriately and progressing toward goals  [x]      Improving slowly and progressing toward goals  []      Not making progress toward goals and plan of care will be adjusted     PLAN:  Patient continues to benefit from skilled intervention to address the above impairments. Continue treatment per established plan of care. Discharge Recommendations:  Rehab  Further Equipment Recommendations for Discharge:  rolling walker     SUBJECTIVE:   Patient stated  I was up earlier this morning     OBJECTIVE DATA SUMMARY:   Critical Behavior:  Neurologic State: Alert, Appropriate for age  Orientation Level: Appropriate for age, Oriented X4  Functional Mobility Training:  Bed Mobility:  Supine to Sit: Contact guard assistance; Additional time; Adaptive equipment  Transfers:  Sit to Stand: Contact guard assistance  Stand to Sit: Contact guard assistance  Balance:  Sitting: Intact  Standing: Impaired; With support  Standing - Static: Fair  Standing - Dynamic : Fair  Ambulation/Gait Training:  Distance (ft): 30 Feet (ft)  Assistive Device: Walker, rolling;Gait belt  Ambulation - Level of Assistance: Contact guard assistance  Gait Abnormalities: Antalgic;Decreased step clearance; Step to gait  Right Side Weight Bearing: As tolerated  Base of Support: Widened  Stance: Right decreased  Speed/Mirlande: Slow  Step Length: Right shortened;Left shortened  Swing Pattern: Right asymmetrical;Left asymmetrical      Pain:  Pain Scale 1: Numeric (0 - 10)  Pain Intensity 1: 5  Pain Location 1: Knee  Pain Orientation 1: Right  Pain Description 1: Aching  Pain Intervention(s) 1: Medication (see MAR)  Activity Tolerance:   Fair     After treatment:   [x] Patient left in no apparent distress sitting up in chair  [] Patient left in no apparent distress in bed  [x] Call bell left within reach  [] Nursing notified  [] Caregiver present  [] Bed alarm activated      Rg Sorto PTA   Time Calculation: 27 mins

## 2018-11-10 VITALS
BODY MASS INDEX: 25.88 KG/M2 | HEART RATE: 87 BPM | SYSTOLIC BLOOD PRESSURE: 140 MMHG | WEIGHT: 128.4 LBS | TEMPERATURE: 97.3 F | RESPIRATION RATE: 18 BRPM | OXYGEN SATURATION: 94 % | HEIGHT: 59 IN | DIASTOLIC BLOOD PRESSURE: 66 MMHG

## 2018-11-10 PROCEDURE — 97110 THERAPEUTIC EXERCISES: CPT

## 2018-11-10 PROCEDURE — 74011250637 HC RX REV CODE- 250/637: Performed by: ORTHOPAEDIC SURGERY

## 2018-11-10 PROCEDURE — 97116 GAIT TRAINING THERAPY: CPT

## 2018-11-10 PROCEDURE — 97530 THERAPEUTIC ACTIVITIES: CPT

## 2018-11-10 PROCEDURE — 74011250637 HC RX REV CODE- 250/637: Performed by: PHYSICIAN ASSISTANT

## 2018-11-10 RX ADMIN — HYDROCODONE BITARTRATE AND ACETAMINOPHEN 1 TABLET: 5; 325 TABLET ORAL at 09:54

## 2018-11-10 RX ADMIN — NIFEDIPINE 30 MG: 30 TABLET, FILM COATED, EXTENDED RELEASE ORAL at 09:54

## 2018-11-10 RX ADMIN — HYDROCODONE BITARTRATE AND ACETAMINOPHEN 1 TABLET: 5; 325 TABLET ORAL at 04:57

## 2018-11-10 RX ADMIN — FLUTICASONE FUROATE AND VILANTEROL TRIFENATATE 1 PUFF: 100; 25 POWDER RESPIRATORY (INHALATION) at 09:00

## 2018-11-10 RX ADMIN — Medication 81 MG: at 09:54

## 2018-11-10 RX ADMIN — LEFLUNOMIDE 10 MG: 10 TABLET ORAL at 09:00

## 2018-11-10 RX ADMIN — OMEPRAZOLE 40 MG: 20 CAPSULE, DELAYED RELEASE ORAL at 09:55

## 2018-11-10 RX ADMIN — FERROUS SULFATE TAB 325 MG (65 MG ELEMENTAL FE) 325 MG: 325 (65 FE) TAB at 09:54

## 2018-11-10 RX ADMIN — Medication 10 ML: at 07:38

## 2018-11-10 RX ADMIN — HYDROCODONE BITARTRATE AND ACETAMINOPHEN 1 TABLET: 5; 325 TABLET ORAL at 14:42

## 2018-11-10 NOTE — ROUTINE PROCESS
Bedside and Verbal shift change report given to WILD Flowers RN (oncoming nurse) by Mariscal [employer] RN (offgoing nurse). Report included the following information SBAR, Kardex, Intake/Output and MAR.

## 2018-11-10 NOTE — ROUTINE PROCESS
Bedside and verbal shift change report given to Shikha Farias RN (oncoming nurse) by Donte Almanzar RN (offgoing nurse). Report included the following information: SBAR, Kardex, MAR, and recent results.

## 2018-11-10 NOTE — PROGRESS NOTES
Problem: Mobility Impaired (Adult and Pediatric)  Goal: *Acute Goals and Plan of Care (Insert Text)  Physical Therapy Goals   Initiated 11/8/2018 and to be accomplished within 3-5 day(s)  1. Patient will move from supine <> sit with S in prep for out of bed activity and change of position. 2.  Patient will perform sit<> stand with S with LRAD in prep for transfers/ambulation. 3.  Patient will transfer from bed <> chair with S with LRAD for time up in chair for completion of ADL activity. 4.  Patient will ambulate 150 feet with LRAD/S for improved functional mobility/safe discharge. 5.  Patient will ascend/descend 3-5 stairs with handrails with minimal assistance/contact guard assist for home re-entry as needed. Attempted PT treatment session at this time however pt requesting pain med prior to. Contacted Nurse Phil Swanson regarding pt request and will return later as pt schedule allows. Pt informs this PT regarding d/c to rehab today but unaware of specific time.

## 2018-11-10 NOTE — PROGRESS NOTES
Page received from Willie Rogers at [facility]- they have not received the dc summary and per Ms. Marah Oliveros, the nurse faxed her the AVS. In chart, no dc summary noted, nurse contacted MD and dc summary faxed by this CM to Ms. Marah Oliveros at [phone]. When call ing Ms. Marah Oliveros to confirm fax number and alert to incoming fax, she states that pt called her to say that they (pt and spouse) had \"a little set back\" but they are on the way. Ms. Marah Oliveros was told medical transport was scheduled. Ms. Marah Oliveros will contact CM if pt does not arrive soon.

## 2018-11-10 NOTE — ROUTINE PROCESS
TRANSFER - OUT REPORT:    Verbal report given to Charmaine Almanza LPN(name) on Nancy Heller  being transferred to [facility] (unit) for routine post - op       Report consisted of patients Situation, Background, Assessment and   Recommendations(SBAR). Information from the following report(s) SBAR, Kardex, Intake/Output, MAR and Alarm Parameters  was reviewed with the receiving nurse. Opportunity for questions and clarification was provided. Patient transported with  SNF short form, ED face sheet and prescriptions.

## 2018-11-10 NOTE — PROGRESS NOTES
1935 Assumed pt care at this time. Report received from Curry Chi RN. Pt rating pain 7/10. Pt in bed in lowest position with wheels locked. Call light within reach. Will continue to monitor. 2105 Assessment complete. Pt is alert and oriented x 4. Denies SOB and chest pain. Pt lungs clear bilaterally. Capillary refill less than 3 seconds. Pt denies numbness and tingling in all extremities. Stated pain 2/10. Pt has 18 G IV to L FA. Pt has mepilex dressing, CDI. SCD to RLE, PLEXI to LLE, and TEDs applied to BLE. Pt encouraged to continue use of IS. Pt verbalized understanding. Ice pack applied. Call light and possessions within reach. Bed in lowest position. Will continue to monitor. 0451 Assisted pt to bathroom with walker. 0500 Pt rating pain 7/10. Administered NORCO prn.    0628 CHG wipes performed. Pt up in chair with fresh ice packs applied. Reassessed pt's pain now at a 0. Shift summary: Pt is alert and oriented x 4. Pt had an uneventful shift. Pt ambulating and voiding sufficient amounts. Pain controlled by PRN medication.

## 2018-11-10 NOTE — PROGRESS NOTES
1300  Bedside and Verbal shift change report given to Curry Chi RN by [facility]. Report included the following information SBAR, Kardex, OR Summary, Intake/Output and MAR.

## 2018-11-10 NOTE — DISCHARGE SUMMARY
[address] [facility]   [address] [facility] [place] [address]     DISCHARGE SUMMARY     PATIENT: Steven Carlin     MRN: [id number]   ADMIT DATE: 2018   BILL[place]   DISCHARGE DATE:  11-10-18     ATTENDING: Angelito Friedman MD   DICTATING: Amadeo Bangura MD     ADMISSION DIAGNOSIS: RIGHT KNEE OSTEOARTHRITIS  Total knee replacement status, right    DISCHARGE DIAGNOSIS: Status post RIGHT KNEE OSTEOARTHRITIS    HISTORY OF PRESENT ILLNESS: The patient is a 80y.o. year-old female   with ongoing right knee pain secondary to osteoarthritis of her right knee. The patient's pain has persisted and progressed despite conservative treatments and therapies. The patient has at this time opted for surgical intervention. PAST MEDICAL HISTORY:   Past Medical History:   Diagnosis Date    Arthritis     Asthma     Cancer ([address]     melanoma 3rd finger left hand    Chronic pain     lower back and right knee    Psoriatic arthritis ([address]     H/O in the past    Rheumatic fever     Subdural hematoma [address] [address]        PAST SURGICAL HISTORY:   Past Surgical History:   Procedure Laterality Date    HX KNEE ARTHROSCOPY Left 2008    HX OTHER SURGICAL      Exc melanoma 3rd finger left hand       ALLERGIES:   Allergies   Allergen Reactions    Inderal [Propranolol] Other (comments)     Weakness and shaking    Lactose Diarrhea     abd pain    Pcn [Penicillins] Other (comments)     Shaking, dizziness, \"totally out of it\" - hospitalized d/t to it.  Sulfa (Sulfonamide Antibiotics) Unknown (comments)    Tramadol Other (comments)     weakness        CURRENT MEDICATIONS:  A list of medications prior to the time of admission include:  Prior to Admission medications    Medication Sig Start Date End Date Taking? Authorizing Provider   aspirin 81 mg chewable tablet Take 1 Tab by mouth two (2) times a day.  18  Yes Johanny Hoang PA-C   oxyCODONE-acetaminophen (PERCOCET) 5-325 mg per tablet 1-2 tabs by mouth every 4-6 hours as needed for pain 11/8/18  Yes Jorge Guillory PA-C   docusate sodium (COLACE) 50 mg capsule Take 2 Caps by mouth three (3) times daily as needed for Constipation for up to 90 days. 11/8/18 2/6/19 Yes Jorge Guillory PA-C   omeprazole (PRILOSEC) 40 mg capsule Take 40 mg by mouth daily. Yes Provider, Historical   mometasone-formoterol (DULERA) 100-5 mcg/actuation HFA inhaler Take 2 Puffs by inhalation two (2) times a day. Yes Provider, Historical   escitalopram oxalate (LEXAPRO) 10 mg tablet Take 10 mg by mouth nightly. Yes Provider, Historical   pedi multivit no.19/folic acid (CHILDREN'S MULTI-VIT GUMMIES PO) Take 2 Tabs by mouth daily. Yes Provider, Historical   leflunomide (ARAVA) 10 mg tablet Take 10 mg by mouth daily. Yes Provider, Historical   NIFEdipine ER (PROCARDIA XL) 30 mg ER tablet Take 30 mg by mouth daily. Yes Provider, Historical   calcium carbonate (TUMS PO) Take 2 Tabs by mouth daily. Yes Provider, Historical   acetaminophen (TYLENOL EXTRA STRENGTH) 500 mg tablet Take 1,000 mg by mouth three (3) times daily as needed for Pain. Yes Provider, Historical   ascorbic acid (DEEPTI-C PO) Take 240 mg by mouth daily. Yes Provider, Historical   lidocaine (XYLOCAINE) 4 % topical cream Apply  to affected area four (4) times daily as needed for Pain. Yes Provider, Historical   ferrous sulfate (IRON) 325 mg (65 mg iron) tablet Take 325 mg by mouth Daily (before breakfast). Provider, Historical       FAMILY HISTORY: History reviewed. No pertinent family history.     SOCIAL HISTORY:   Social History     Socioeconomic History    Marital status:      Spouse name: Not on file    Number of children: Not on file    Years of education: Not on file    Highest education level: Not on file   Social Needs    Financial resource strain: Not on file    Food insecurity - worry: Not on file    Food insecurity - inability: Not on file  Transportation needs - medical: Not on file   [employer] needs - non-medical: Not on file   Occupational History    Not on file   Tobacco Use    Smoking status: Former Smoker    Smokeless tobacco: Never Used   Substance and Sexual Activity    Alcohol use: No     Frequency: Never    Drug use: No    Sexual activity: No   Other Topics Concern    Not on file   Social History Narrative    Not on file       REVIEW OF SYSTEMS: All review of systems are negative. PHYSICAL EXAMINATION: For a detailed physical exam, please refer to the patient's chart. HOSPITAL COURSE: The patient was taken to surgery the day of admission. she underwent right total knee replacement. Operative course was benign. Estimated blood loss approximately 200 cc. The patient was taken to the PACU in stable condition and was later taken to the floor in stable condition. During her hospital stay, the patient progressed well with physical therapy and occupational therapy, adherent to instructions. she was placed on Aspirin and mechanical compression for DVT prophylaxis. her pain has been well controlled with oral pain medications. her vitals have remained stable. she has also remained hemodynamically stable. DISCHARGE INSTRUCTIONS: The patient is to be transferred to SNF. she is to continue on her prior medications per the medication reconciliation form, to which we will add:  1. Aspirin 81 mg; 1 tablet p.o. Twice daily for 6 weeks  2. Colace 100 mg by mouth 3 times daily as needed for constipation  3. Percocet 5/325 mg; 1-2 tablets p.o. every 4 to 6 hours as needed for pain    The patient is to continue with physical therapy to work on gait training, range of motion, strengthening, and weightbearing exercises as tolerated on her right lower extremity. The patient is to progress from a walker to a cane to complete total weightbearing as tolerable. The patient is to continue to keep her incision dry.   The patient is to followup with Dr. Stacey Pederson in the office approximately 10-14 days status post for x-rays and further evaluation.     Krystle Power MD  45/[id number]:08 PM

## 2018-11-10 NOTE — PROGRESS NOTES
Problem: Falls - Risk of  Goal: *Absence of Falls  Document Bruno Fall Risk and appropriate interventions in the flowsheet. Outcome: Progressing Towards Goal  Fall Risk Interventions:  Mobility Interventions: Patient to call before getting OOB, Strengthening exercises (ROM-active/passive), Utilize walker, cane, or other assistive device    Mentation Interventions: Adequate sleep, hydration, pain control    Medication Interventions: Patient to call before getting OOB, Teach patient to arise slowly    Elimination Interventions: Call light in reach, Toileting schedule/hourly rounds, Patient to call for help with toileting needs    History of Falls Interventions:  Investigate reason for fall

## 2018-11-10 NOTE — PROGRESS NOTES
1940  Bedside and Verbal shift change report given to ALEX AlexisRN by Apryl Parsad RN. Report included the following information SBAR, Kardex, OR Summary, Intake/Output and MAR.

## 2018-11-10 NOTE — PROGRESS NOTES
2008: Assessment completed. Patient is A&O X4. Patient is calm and cooperative. Family at bedside. Patient's oral mucosa pink and moist, strong  on both upper extremities. Lung sound clear, not appear distress. Bowel sounds active. Pedal pulses are palpable, no complain of any numbness or tingling. IV site dry and dressing intact. Mepilex dressing to right knee is clean and dry, Ice is applied. SCD and Brody hose are applied. Pain is 5/10. PRN pain medication is given per MAR. bed is in lower position, call bell and personal items are within reach. Pain regimen and activities are educated, educated pt to call when getting up to prevent fall. Pt verbalized understanding. 1024: Pt's  at bedside, he is on his 80. Daughter won't be here to transported the pt. Page  at this time. 1028: Spoke with Blade Courser at this time. 1116: Pt said that would like to arrange for medical transport. Notified pt that cost may not cover by insurance, verbalized understanding.  at bedside. 1328:  Spoke with Dr Whitley Sage. She said pt is medically stable to be discharge. 1430: Pt refused medical transport and  said that he is going to transfer pt.     1700: Contacted with [facility] at this time and they said they received the discharge summary through fax.

## 2018-11-10 NOTE — DISCHARGE INSTRUCTIONS
[facility]     Patient Discharge Instructions    Johanny White / [id number] : 1933    Admitted 2018 Discharged: 11/10/2018     IF YOU HAVE ANY PROBLEMS ONCE YOU ARE AT HOME CALL THE FOLLOWING NUMBERS:   Main office number: [phone]    Your follow up appointment to see either Dr. Sanya Santoro is scheduled in 1-2 weeks. If you are unsure of your appointment date call the office at [phone]. Take Home Medications     · Resume your home medictions as directed  · A prescription for pain medication has been given   · It is important that you take the medication exactly as they are prescribed. · Keep your medication in the bottles provided by the pharmacist and keep a list of the medication names, dosages, and times to be taken in your wallet. · Do not take other medications without consulting your doctor. · Note:  If you have already received and/or filled a prescription for one or more of the medications you've received a prescription for when leaving the hospital, you may disguard the duplicate prescription. What to do at [address] [place] [address] Ave your prehospital diet. If you have excessive nausea or vomitting call your doctor's office     Wear portable sequential compressive devices at least 18 hours per day for 2 weeks. They may be used beyond 2 weeks as needed to help control swelling. BURTON hose should be worn during the day - may be removed for sleep. Should be worn on both legs for 2 weeks and then on the operative extremity for a total of 6 weeks. Begin In-Home Physical Therapy; 3 times a week to work on gait training, range of motion, strengthening, and weight bearing exercises as tolerable. Continue to use your walker or cane when walking. May progress from the walker to a cane to complete total bearing as tolerable. Keep incision DRY. You may need to sponge bathe to keep the incision dry.     When to Call    - Call if you have a temperature greater then 101  - Unable to keep food down  - Are unable to bear any wieght   - Need a pain medication refill     Information obtained by :  I understand that if any problems occur once I am at home I am to contact my physician. I understand and acknowledge receipt of the instructions indicated above.                                                                                                                                            Physician's or R.N.'s Signature                                                                  Date/Time                                                                                                                                              Patient or Representative Signature                                                          Date/Time

## 2018-11-10 NOTE — PROGRESS NOTES
Problem: Mobility Impaired (Adult and Pediatric)  Goal: *Acute Goals and Plan of Care (Insert Text)  Physical Therapy Goals   Initiated 11/8/2018 and to be accomplished within 3-5 day(s)  1. Patient will move from supine <> sit with S in prep for out of bed activity and change of position. 2.  Patient will perform sit<> stand with S with LRAD in prep for transfers/ambulation. 3.  Patient will transfer from bed <> chair with S with LRAD for time up in chair for completion of ADL activity. 4.  Patient will ambulate 150 feet with LRAD/S for improved functional mobility/safe discharge. 5.  Patient will ascend/descend 3-5 stairs with handrails with minimal assistance/contact guard assist for home re-entry as needed. Outcome: Progressing Towards Goal  physical Therapy TREATMENT    Patient: Alva Sadler (89 y.o. female)  Date: 11/10/2018  Diagnosis: RIGHT KNEE OSTEOARTHRITIS  Total knee replacement status, right <principal problem not specified>  Procedure(s) (LRB):  RIGHT  KNEE COMPLEX PRIMARY TOTAL KNEE ARTHROPLASTY AND ALL INDICATED PROCEDURES (Right) 3 Days Post-Op  Precautions: Fall, WBAT  Chart, physical therapy assessment, plan of care and goals were reviewed. ASSESSMENT:  Pt rating pain in R knee w/ mobility 5/10 on numerical pain scale. Pt cont to need vc/tc for proper HEP. Pt progressing steadily w/ PT as pt able to participate in transfers supine<>sit<>stand CGA/min A and vc for techniques. Pt able to increase gt distance in hallway w/ RW, WBAT, GB and CGA however pattern remains antalgic, step to w/ significant step to and shortened R step length. Pt able to void on commode and perform own hygiene. Pt demonstrates dec control w/ stand to sit requiring intermittent min A. Pt returned to supine in bed w/ all needs within reach, ice pack to R knee and SCDs reapplied. Nurse Mine Cardozo aware and  present. Plan is for pt to d/c to rehab today.   Progression toward goals:  [x] Improving appropriately and progressing toward goals  []      Improving slowly and progressing toward goals  []      Not making progress toward goals and plan of care will be adjusted     PLAN:  Patient continues to benefit from skilled intervention to address the above impairments. Continue treatment per established plan of care. Discharge Recommendations:  Rehab  Further Equipment Recommendations for Discharge:  N/A     SUBJECTIVE:   Patient stated I just took my pain medicine but I will try.     OBJECTIVE DATA SUMMARY:   Critical Behavior:  Neurologic State: Alert, Appropriate for age  Orientation Level: Oriented X4  Cognition: Appropriate decision making, Appropriate for age attention/concentration, Appropriate safety awareness, Follows commands  Functional Mobility Training:  Bed Mobility:  Supine to Sit: Minimum assistance;Contact guard assistance; Additional time(vc)  Sit to Supine: Contact guard assistance; Additional time(vc)  Scooting: Stand-by assistance; Additional time(vc)  Transfers:  Sit to Stand: Contact guard assistance; Additional time(vc)  Stand to Sit: Contact guard assistance;Minimum assistance; Additional time(vc)  Balance:  Sitting: Intact  Standing: Intact; With support  Standing - Static: Fair;Constant support  Standing - Dynamic : Fair   Range Of Motion:  Ambulation/Gait Training:  Distance (ft): 100 Feet (ft)  Assistive Device: Walker, rolling;Gait belt  Ambulation - Level of Assistance: Contact guard assistance; Additional time(vc)  Gait Abnormalities: Antalgic;Decreased step clearance; Step to gait  Right Side Weight Bearing: As tolerated  Base of Support: Shift to left  Stance: Right decreased  Speed/Mirlande: Slow  Step Length: Left shortened;Right shortened  Swing Pattern: Left asymmetrical;Right asymmetrical  Neuro Re-Education:  Therapeutic Exercises:   Pt able to participate in ankle pumps, ankle circles, quad sets, AA heel slides and hamstring sets x10 reps each on R.   Pain:  Pain Scale 1: Numeric (0 - 10)  Pain Intensity 1: 5  Pain Location 1: Knee  Pain Orientation 1: Right  Pain Description 1: Aching  Pain Intervention(s) 1: Medication (see MAR); Ice  Activity Tolerance:   Fair+  Please refer to the flowsheet for vital signs taken during this treatment.   After treatment:   [] Patient left in no apparent distress sitting up in chair  [x] Patient left in no apparent distress in bed  [x] Call bell left within reach  [x] Nursing notified  [] Caregiver present  [] Bed alarm activated      Ramos Mcguire PT   Time Calculation: 60 mins

## 2018-11-10 NOTE — PROGRESS NOTES
Hospitalist Progress Note    Patient: Narinder Herron MRN: [id number]  CSN: [id number]    YOB: 1933  Age: 80 y.o. Sex: female    DOA: 11/7/2018 LOS:  LOS: 3 days                Assessment and Plan: Active Problems: Total knee replacement status, right (11/7/2018)      Tachycardia (11/8/2018)      SOB (shortness of breath) (11/8/2018)        1. Tachycardia and dyspnea on exertion with PT yesterday. doign better today    2. Possible narcotic effect from postoperative pain medication. 3.  History of rheumatoid arthritis. 4.  Status post right knee replacement. 5.  History of aortic and mitral valve disease     Medically clear for rehab    Chief complaint:  Asked to consult for dizziness and tachycardia and dyspnea post op      Subjective:  Feels pretty good overall  , much better today. Review of systems:    General: No fevers or chills. Cardiovascular: No chest pain or pressure. No palpitations. Pulmonary: No shortness of breath. Gastrointestinal: No nausea, vomiting. Objective:    Vital signs/Intake and Output:  Visit Vitals  /66 (BP 1 Location: Right arm, BP Patient Position: At rest)   Pulse 87   Temp 97.3 °F (36.3 °C)   Resp 18   Ht 4' 11\" (1.499 m)   Wt 58.2 kg (128 lb 6.4 oz)   SpO2 94%   BMI 25.93 kg/m²     Current Shift:  11/10 0701 - 11/10 1900  In: 960 [P.O.:960]  Out: -   Last three shifts:  11/08 1901 - 11/10 0700  In: 1100 [P.O.:1100]  Out: 1100 [Urine:1100]    Physical Exam:  General: NAD, AAOx3. Non-toxic. HEENT: NC/AT. PERRLA, EOMI.  MMM. Lungs: Nml inspection. CTA B/L. No wheezing, rales or rhonchi. Heart:  S1S2 RRR,  PMI mid 5th IC space. No M/RG. Abdomen: Soft, NT/ND.  BS+. No peritoneal signs. Extremities: No C/C/E. Psych:   Nml affect. Neurologic:  2-12 intact. Strength 5/5 throughout.   Sensation symmetrical.          Labs: Results:       Chemistry Recent Labs     11/08/18  1854   GLU 98   *   K 4.5      CO2 25   BUN 12 CREA 0.70   CA 7.6*   AGAP 9   BUCR 17      CBC w/Diff Recent Labs     11/08/18  1854   WBC 9.3   RBC 3.52*   HGB 10.0*   HCT 31.1*      GRANS 80*   LYMPH 8*   EOS 1      Cardiac Enzymes Recent Labs     11/08/18  1854      CKND1 1.1      Coagulation No results for input(s): PTP, INR, APTT in the last 72 hours. No lab exists for component: INREXT    Lipid Panel No results found for: CHOL, CHOLPOCT, CHOLX, CHLST, CHOLV, 682556, HDL, LDL, LDLC, DLDLP, 555054, VLDLC, VLDL, TGLX, TRIGL, TRIGP, TGLPOCT, CHHD, CHHDX   BNP No results for input(s): BNPP in the last 72 hours. Liver Enzymes No results for input(s): TP, ALB, TBIL, AP, SGOT, GPT in the last 72 hours.     No lab exists for component: DBIL   Thyroid Studies No results found for: T4, T3U, TSH, TSHEXT     Procedures/imaging: see electronic medical records for all procedures/Xrays and details which were not copied into this note but were reviewed prior to creation of Plan

## 2018-11-11 LAB
ATRIAL RATE: 82 BPM
CALCULATED P AXIS, ECG09: 111 DEGREES
CALCULATED R AXIS, ECG10: -56 DEGREES
CALCULATED T AXIS, ECG11: 54 DEGREES
DIAGNOSIS, 93000: NORMAL
P-R INTERVAL, ECG05: 176 MS
Q-T INTERVAL, ECG07: 364 MS
QRS DURATION, ECG06: 80 MS
QTC CALCULATION (BEZET), ECG08: 425 MS
VENTRICULAR RATE, ECG03: 82 BPM

## 2021-07-26 PROBLEM — M17.11 OSTEOARTHRITIS OF RIGHT KNEE: Status: ACTIVE | Noted: 2018-05-10

## 2021-07-26 PROBLEM — H35.372 EPIRETINAL MEMBRANE, LEFT: Status: ACTIVE | Noted: 2017-08-03

## 2021-07-26 PROBLEM — R79.89 ELEVATED SERUM CREATININE: Status: ACTIVE | Noted: 2021-07-26

## 2021-07-26 PROBLEM — J45.909 ASTHMA: Status: ACTIVE | Noted: 2021-07-26

## 2021-07-26 PROBLEM — I50.9 PLEURAL EFFUSION DUE TO CHF (CONGESTIVE HEART FAILURE) (HCC): Status: ACTIVE | Noted: 2020-03-14

## 2021-07-26 PROBLEM — H52.4 HYPEROPIA WITH ASTIGMATISM AND PRESBYOPIA, BILATERAL: Status: ACTIVE | Noted: 2018-02-02

## 2021-07-26 PROBLEM — G89.29 CHRONIC BACK PAIN: Status: ACTIVE | Noted: 2021-07-26

## 2021-07-26 PROBLEM — J45.40 MODERATE PERSISTENT ASTHMA, UNCOMPLICATED: Status: ACTIVE | Noted: 2018-03-23

## 2021-07-26 PROBLEM — M06.4 UNDIFFERENTIATED INFLAMMATORY ARTHRITIS (HCC): Status: ACTIVE | Noted: 2019-10-05

## 2021-07-26 PROBLEM — M41.9 SCOLIOSIS OF THORACOLUMBAR SPINE: Status: ACTIVE | Noted: 2019-11-26

## 2021-07-26 PROBLEM — Z74.09 IMPAIRED MOBILITY AND ADLS: Status: ACTIVE | Noted: 2019-10-21

## 2021-07-26 PROBLEM — I10 ESSENTIAL (PRIMARY) HYPERTENSION: Status: ACTIVE | Noted: 2018-11-13

## 2021-07-26 PROBLEM — Z91.89 PNEUMOCOCCAL VACCINATION INDICATED: Status: ACTIVE | Noted: 2021-07-26

## 2021-07-26 PROBLEM — Z78.9 IMPAIRED MOBILITY AND ADLS: Status: ACTIVE | Noted: 2019-10-21

## 2021-07-26 PROBLEM — R79.9 ELEVATED BUN: Status: ACTIVE | Noted: 2021-07-26

## 2021-07-26 PROBLEM — R10.30 LOWER ABDOMINAL PAIN: Status: ACTIVE | Noted: 2018-10-17

## 2021-07-26 PROBLEM — Z91.81 AT HIGH RISK FOR FALLS: Status: ACTIVE | Noted: 2018-11-13

## 2021-07-26 PROBLEM — H52.03 HYPEROPIA WITH ASTIGMATISM AND PRESBYOPIA, BILATERAL: Status: ACTIVE | Noted: 2018-02-02

## 2021-07-26 PROBLEM — G61.82: Status: ACTIVE | Noted: 2017-10-17

## 2021-07-26 PROBLEM — D50.0 IRON DEFICIENCY ANEMIA DUE TO CHRONIC BLOOD LOSS: Status: ACTIVE | Noted: 2018-10-11

## 2021-07-26 PROBLEM — H31.012 MACULAR SCAR OF LEFT EYE: Status: ACTIVE | Noted: 2019-09-19

## 2021-07-26 PROBLEM — M54.9 CHRONIC BACK PAIN: Status: ACTIVE | Noted: 2021-07-26

## 2021-07-26 PROBLEM — G62.9 PERIPHERAL NEUROPATHY: Status: ACTIVE | Noted: 2017-05-23

## 2021-07-26 PROBLEM — G43.909 MIGRAINE: Status: ACTIVE | Noted: 2021-07-26

## 2021-07-26 PROBLEM — M53.3 SACROILIAC JOINT DYSFUNCTION OF BOTH SIDES: Status: ACTIVE | Noted: 2019-06-04

## 2021-07-26 PROBLEM — I08.0: Status: ACTIVE | Noted: 2021-07-26

## 2021-07-26 PROBLEM — N28.9 RENAL INSUFFICIENCY SYNDROME: Status: ACTIVE | Noted: 2021-07-13

## 2021-07-26 PROBLEM — M48.062 SPINAL STENOSIS OF LUMBAR REGION WITH NEUROGENIC CLAUDICATION: Status: ACTIVE | Noted: 2019-05-01

## 2021-07-26 PROBLEM — I50.32 CHRONIC DIASTOLIC (CONGESTIVE) HEART FAILURE (HCC): Status: ACTIVE | Noted: 2019-10-10

## 2021-07-26 PROBLEM — H34.8312 BRANCH RETINAL VEIN OCCLUSION OF RIGHT EYE: Status: ACTIVE | Noted: 2019-02-08

## 2021-07-26 PROBLEM — I48.0 PAROXYSMAL ATRIAL FIBRILLATION (HCC): Status: ACTIVE | Noted: 2020-03-13

## 2021-07-26 PROBLEM — M81.0 SENILE OSTEOPOROSIS: Status: ACTIVE | Noted: 2019-12-18

## 2021-07-26 PROBLEM — H52.203 HYPEROPIA WITH ASTIGMATISM AND PRESBYOPIA, BILATERAL: Status: ACTIVE | Noted: 2018-02-02

## (undated) DEVICE — SUT VCRL + 1 36IN CT1 VIO --

## (undated) DEVICE — SPONGE LAP 18X18IN STRL -- 5/PK

## (undated) DEVICE — KENDALL SCD EXPRESS SLEEVES, KNEE LENGTH, MEDIUM: Brand: KENDALL SCD

## (undated) DEVICE — ZIMMER® STERILE DISPOSABLE TOURNIQUET CUFF WITH PROTECTIVE SLEEVE AND PLC, SINGLE PORT, SINGLE BLADDER, 34 IN. (86 CM)

## (undated) DEVICE — SUT MCRYL + 3-0 27IN PS1 UD --

## (undated) DEVICE — SUT VCRL + 3-0 27IN CT2 UD --

## (undated) DEVICE — BANDAGE COMPR SELF ADH 5 YDX4 IN TAN STRL PREMIERPRO LF

## (undated) DEVICE — (D)PREP SKN CHLRAPRP APPL 26ML -- CONVERT TO ITEM 371833

## (undated) DEVICE — SINGLE PORT MANIFOLD: Brand: NEPTUNE 2

## (undated) DEVICE — SOL IRR NACL 0.9% 500ML POUR --

## (undated) DEVICE — SOL INJ L R 1000ML BG --

## (undated) DEVICE — PACK PROCEDURE SURG TOT KNEE CUST

## (undated) DEVICE — SUTURE STRATAFIX SPRL SZ 1 L5IN ABSRB VLT CT-1 L36MM 1/2 SXPD2B401

## (undated) DEVICE — T4 ZIPPER TOGA, (L/XL)

## (undated) DEVICE — Z DISCONTINUED USE 2429233 DRESSING FOAM W10XL10CM 5 LAYR SELF ADH VERSATILE SAFETAC

## (undated) DEVICE — T5 HOOD WITH PEEL AWAY FACE SHIELD

## (undated) DEVICE — DRSG FOAM MEPILX PST OP 4X12IN --

## (undated) DEVICE — NEEDLE HYPO 22GA L1.5IN BLK S STL HUB POLYPR SHLD REG BVL

## (undated) DEVICE — SHEET,DRAPE,70X85,STERILE: Brand: MEDLINE

## (undated) DEVICE — STERILE POLYISOPRENE POWDER-FREE SURGICAL GLOVES: Brand: PROTEXIS

## (undated) DEVICE — 3 BONE CEMENT MIXER: Brand: MIXEVAC

## (undated) DEVICE — STERILE POLYISOPRENE POWDER-FREE SURGICAL GLOVES WITH EMOLLIENT COATING: Brand: PROTEXIS

## (undated) DEVICE — LEGGINGS, PAIR, 31X48, STERILE: Brand: MEDLINE

## (undated) DEVICE — BLADE SAW W13XL90MM 1.19MM PARA

## (undated) DEVICE — UNDERCAST PADDING: Brand: DEROYAL

## (undated) DEVICE — STRYKER RECIPROCATOR BLADE REPLACEMENT, 12.5 X 76 X 0.9 MM: Brand: CONMED

## (undated) DEVICE — STERILE TETRA-FLEX CF LF, 6IN X 11 YD: Brand: TETRA-FLEX™ CF

## (undated) DEVICE — SUT VCRL + 0 27IN CT2 UD --

## (undated) DEVICE — LINER GLV L R FULL FNGR KEVLAR LYCRA CUT RESIST PWD FREE ST

## (undated) DEVICE — SUT VCRL + 0 36IN CT1 UD --

## (undated) DEVICE — REM POLYHESIVE ADULT PATIENT RETURN ELECTRODE: Brand: VALLEYLAB

## (undated) DEVICE — BLADE SAW 1.19X20X90 MM FOR LG BNE

## (undated) DEVICE — HANDPIECE SET WITH FAN SPRAY TIP: Brand: INTERPULSE

## (undated) DEVICE — INTENDED FOR TISSUE SEPARATION, AND OTHER PROCEDURES THAT REQUIRE A SHARP SURGICAL BLADE TO PUNCTURE OR CUT.: Brand: BARD-PARKER ® CARBON RIB-BACK BLADES

## (undated) DEVICE — SYR 10ML CTRL LR LCK NSAF LF --